# Patient Record
Sex: MALE | Race: WHITE | Employment: OTHER | URBAN - METROPOLITAN AREA
[De-identification: names, ages, dates, MRNs, and addresses within clinical notes are randomized per-mention and may not be internally consistent; named-entity substitution may affect disease eponyms.]

---

## 2017-02-07 RX ORDER — EZETIMIBE 10 MG/1
10 TABLET ORAL DAILY
Qty: 30 TAB | Refills: 11 | Status: SHIPPED | OUTPATIENT
Start: 2017-02-07 | End: 2018-01-08 | Stop reason: SDUPTHER

## 2017-02-07 RX ORDER — ATORVASTATIN CALCIUM 80 MG/1
80 TABLET, FILM COATED ORAL DAILY
Qty: 90 TAB | Refills: 3 | Status: SHIPPED | OUTPATIENT
Start: 2017-02-07 | End: 2018-01-08 | Stop reason: SDUPTHER

## 2017-09-06 RX ORDER — METOPROLOL TARTRATE 25 MG/1
25 TABLET, FILM COATED ORAL 2 TIMES DAILY
Qty: 180 TAB | Refills: 3 | Status: SHIPPED | OUTPATIENT
Start: 2017-09-06 | End: 2018-01-08 | Stop reason: SDUPTHER

## 2017-11-13 ENCOUNTER — OFFICE VISIT (OUTPATIENT)
Dept: CARDIOLOGY CLINIC | Age: 72
End: 2017-11-13

## 2017-11-13 VITALS
DIASTOLIC BLOOD PRESSURE: 53 MMHG | HEIGHT: 73 IN | BODY MASS INDEX: 24.39 KG/M2 | SYSTOLIC BLOOD PRESSURE: 114 MMHG | HEART RATE: 57 BPM | WEIGHT: 184 LBS

## 2017-11-13 DIAGNOSIS — E78.00 PURE HYPERCHOLESTEROLEMIA: ICD-10-CM

## 2017-11-13 DIAGNOSIS — Z98.61 POSTSURGICAL PERCUTANEOUS TRANSLUMINAL CORONARY ANGIOPLASTY STATUS: ICD-10-CM

## 2017-11-13 DIAGNOSIS — I25.10 ATHEROSCLEROSIS OF NATIVE CORONARY ARTERY OF NATIVE HEART WITHOUT ANGINA PECTORIS: Primary | ICD-10-CM

## 2017-11-13 NOTE — PROGRESS NOTES
1. Have you been to the ER, urgent care clinic since your last visit? Hospitalized since your last visit? No    2. Have you seen or consulted any other health care providers outside of the [address] [place] [address] since your last visit? Include any pap smears or colon screening. Yes Where: PCP     3. Since your last visit, have you had any of the following symptoms? .         4. Have you had any blood work, X-rays or cardiac testing? No             5.  Where do you normally have your labs drawn? [facility]    6. Do you need any refills today?    No

## 2017-11-13 NOTE — MR AVS SNAPSHOT
Visit Information Date & Time Provider Department Dept. Phone Encounter #  
 11/13/2017  8:15 AM Davis Landers MD [facility] [place] [phone] Follow-up Instructions Return in about 1 year (around 11/13/2018), or if symptoms worsen or fail to improve, for with ekg. Upcoming Health Maintenance Date Due Hepatitis C Screening 1945 DTaP/Tdap/Td series (1 - Tdap) 7/2/1967 FOBT Q 1 YEAR AGE 50-75 7/2/1996 ZOSTER VACCINE AGE 60> 5/2/2006 GLAUCOMA SCREENING Q2Y 7/2/2011 Pneumococcal 65+ High/Highest Risk (1 of 2 - PCV13) 7/2/2011 MEDICARE YEARLY EXAM 7/2/2011 Influenza Age 5 to Adult 8/1/2017 Allergies as of 11/13/2017  Review Complete On: 11/13/2017 By: Davis Landers MD  
 No Known Allergies Current Immunizations  Never Reviewed No immunizations on file. Not reviewed this visit You Were Diagnosed With   
  
 Codes Comments Atherosclerosis of native coronary artery of native heart without angina pectoris    -  Primary ICD-10-CM: I25.10 ICD-9-CM: 414.01 Stable LAD RAHEL last 3/2011 Pure hypercholesterolemia     ICD-10-CM: E78.00 ICD-9-CM: 272.0 9/15 LDL68; 3/15 MMD91;  
 Postsurgical percutaneous transluminal coronary angioplasty status     ICD-10-CM: Z98.61 ICD-9-CM: V45.82 Vitals BP Pulse Height(growth percentile) Weight(growth percentile) BMI Smoking Status 114/53 (!) 57 6' 1\" (1.854 m) 184 lb (83.5 kg) 24.28 kg/m2 Former Smoker Vitals History BMI and BSA Data Body Mass Index Body Surface Area  
 24.28 kg/m 2 2.07 m 2 Preferred Pharmacy Pharmacy Name Phone  
 [address] [place] [address] [phone] Your Updated Medication List  
  
   
This list is accurate as of: 11/13/17  8:51 AM.  Always use your most recent med list.  
  
  
  
  
 aspirin delayed-release 81 mg tablet Take  by mouth daily. atorvastatin 80 mg tablet Commonly known as:  LIPITOR Take 1 Tab by mouth daily. ezetimibe 10 mg tablet Commonly known as:  Olivia Rich Take 1 Tab by mouth daily. metoprolol tartrate 25 mg tablet Commonly known as:  LOPRESSOR Take 1 Tab by mouth two (2) times a day. nitroglycerin 0.4 mg SL tablet Commonly known as:  NITROSTAT  
1 Tab by SubLINGual route every five (5) minutes as needed for Chest Pain for up to 3 doses. TYLENOL EXTRA STRENGTH PO Take  by mouth as needed. Follow-up Instructions Return in about 1 year (around 11/13/2018), or if symptoms worsen or fail to improve, for with ekg. To-Do List   
 Around 11/13/2017 Lab:  HEPATIC FUNCTION PANEL Around 11/13/2017 Lab:  LIPID PANEL Patient Instructions There are no discontinued medications. Orders Placed This Encounter  LIPID PANEL Standing Status:   Future Standing Expiration Date:   2/13/2018  
 HEPATIC FUNCTION PANEL Standing Status:   Future Standing Expiration Date:   2/13/2018 Learning About Coronary Artery Disease (CAD) What is coronary artery disease? Coronary artery disease (CAD) occurs when plaque builds up in the arteries that bring oxygen-rich blood to your heart. Plaque is a fatty substance made of cholesterol, calcium, and other substances in the blood. This process is called hardening of the arteries, or atherosclerosis. What happens when you have coronary artery disease? · Plaque may narrow the coronary arteries. Narrowed arteries cause poor blood flow. This can lead to angina symptoms such as chest pain or discomfort. If blood flow is completely blocked, you could have a heart attack. · You can slow CAD and reduce the risk of future problems by making changes in your lifestyle. These include quitting smoking and eating heart-healthy foods.  
· Treatments for CAD, along with changes in your lifestyle, can help you live a longer and healthier life. How can you prevent coronary artery disease? · Do not smoke. It may be the best thing you can do to prevent heart disease. If you need help quitting, talk to your doctor about stop-smoking programs and medicines. These can increase your chances of quitting for good. · Be active. Get at least 30 minutes of exercise on most days of the week. Walking is a good choice. You also may want to do other activities, such as running, swimming, cycling, or playing tennis or team sports. · Eat heart-healthy foods. Eat more fruits and vegetables and less foods that contain saturated and trans fats. Limit alcohol, sodium, and sweets. · Stay at a healthy weight. Lose weight if you need to. · Manage other health problems such as diabetes, high blood pressure, and high cholesterol. · Manage stress. Stress can hurt your heart. To keep stress low, talk about your problems and feelings. Don't keep your feelings hidden. · If you have talked about it with your doctor, take a low-dose aspirin every day. Aspirin can help certain people lower their risk of a heart attack or stroke. But taking aspirin isn't right for everyone, because it can cause serious bleeding. Do not start taking daily aspirin unless your doctor knows about it. How is coronary artery disease treated? · Your doctor will suggest that you make lifestyle changes. For example, your doctor may ask you to eat healthy foods, quit smoking, lose extra weight, and be more active. · You will have to take medicines. · Your doctor may suggest a procedure to open narrowed or blocked arteries. This is called angioplasty. Or your doctor may suggest using healthy blood vessels to create detours around narrowed or blocked arteries. This is called bypass surgery. Follow-up care is a key part of your treatment and safety.  Be sure to make and go to all appointments, and call your doctor if you are having problems. It's also a good idea to know your test results and keep a list of the medicines you take. Where can you learn more? Go to http://kellen-leslie.info/. Enter [phone] in the search box to learn more about \"Learning About Coronary Artery Disease (CAD). \" Current as of: September 21, 2016 Content Version: 11.4 © 6226-5515 Mtivity. Care instructions adapted under license by Flapshare (which disclaims liability or warranty for this information). If you have questions about a medical condition or this instruction, always ask your healthcare professional. [place] [address] any warranty or liability for your use of this information. Introducing [facility]! Holli Santo introduces Riskonnect patient portal. Now you can access parts of your medical record, email your doctor's office, and request medication refills online. 1. In your internet browser, go to [email or web address] 2. Click on the First Time User? Click Here link in the Sign In box. You will see the New Member Sign Up page. 3. Enter your Riskonnect Access Code exactly as it appears below. You will not need to use this code after youve completed the sign-up process. If you do not sign up before the expiration date, you must request a new code. · Riskonnect Access Code: [email or web address] Expires: 2/11/2018  8:16 AM 
 
4. Enter the last four digits of your Social Security Number (xxxx) and Date of Birth (mm/dd/yyyy) as indicated and click Submit. You will be taken to the next sign-up page. 5. Create a Riskonnect ID. This will be your Riskonnect login ID and cannot be changed, so think of one that is secure and easy to remember. 6. Create a Riskonnect password. You can change your password at any time. 7. Enter your Password Reset Question and Answer. This can be used at a later time if you forget your password. 8. Enter your e-mail address. You will receive e-mail notification when new information is available in [address] 9. Click Sign Up. You can now view and download portions of your medical record. 10. Click the Download Summary menu link to download a portable copy of your medical information. If you have questions, please visit the Frequently Asked Questions section of the Pre Play Sports website. Remember, Pre Play Sports is NOT to be used for urgent needs. For medical emergencies, dial 911. Now available from your iPhone and Android! Please provide this summary of care documentation to your next provider. Your primary care clinician is listed as [facility] Saurav. If you have any questions after today's visit, please call [phone].

## 2017-11-13 NOTE — LETTER
Priscilla Lea 1945 11/13/2017 Dear LARA Lopez MD Jim Sheehan, MD 
 
I had the pleasure of evaluating  Mr. Edge in office today. Below are the relevant portions of my assessment and plan of care. ICD-10-CM ICD-9-CM 1. Atherosclerosis of native coronary artery of native heart without angina pectoris I25.10 414.01 Stable LAD RAHEL last 3/2011 2. Pure hypercholesterolemia E78.00 272.0 LIPID PANEL  
   HEPATIC FUNCTION PANEL  
 9/15 LDL68; 3/15 LDL89;  
3. Postsurgical percutaneous transluminal coronary angioplasty status Z98.61 V45.82 Current Outpatient Prescriptions Medication Sig Dispense Refill  metoprolol tartrate (LOPRESSOR) 25 mg tablet Take 1 Tab by mouth two (2) times a day. 180 Tab 3  
 ezetimibe (ZETIA) 10 mg tablet Take 1 Tab by mouth daily. 30 Tab 11  
 atorvastatin (LIPITOR) 80 mg tablet Take 1 Tab by mouth daily. 90 Tab 3  
 aspirin delayed-release 81 mg tablet Take  by mouth daily.  nitroglycerin (NITROSTAT) 0.4 mg SL tablet 1 Tab by SubLINGual route every five (5) minutes as needed for Chest Pain for up to 3 doses. 25 Tab 0  ACETAMINOPHEN (TYLENOL EXTRA STRENGTH PO) Take  by mouth as needed. Orders Placed This Encounter  LIPID PANEL Standing Status:   Future Standing Expiration Date:   2/13/2018  
 HEPATIC FUNCTION PANEL Standing Status:   Future Standing Expiration Date:   2/13/2018 If you have questions, please do not hesitate to call me. I look forward to following Mr. Edge along with you. Sincerely, Demi Sanchez MD
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
## 2017-11-14 LAB
A-G RATIO,AGRAT: 1.5 RATIO (ref 1.1–2.6)
ALBUMIN SERPL-MCNC: 4.4 G/DL (ref 3.5–5)
ALP SERPL-CCNC: 87 U/L (ref 40–125)
ALT SERPL-CCNC: 20 U/L (ref 5–40)
AST SERPL W P-5'-P-CCNC: 24 U/L (ref 10–37)
BILIRUB SERPL-MCNC: 0.6 MG/DL (ref 0.2–1.2)
BILIRUBIN, DIRECT,CBIL: <0.2 MG/DL (ref 0–0.3)
CHOLEST SERPL-MCNC: 134 MG/DL (ref 110–200)
GLOBULIN,GLOB: 3 G/DL (ref 2–4)
HDLC SERPL-MCNC: 39 MG/DL (ref 40–59)
LDLC SERPL CALC-MCNC: 73 MG/DL (ref 50–99)
PROT SERPL-MCNC: 7.4 G/DL (ref 6.2–8.1)
TRIGL SERPL-MCNC: 109 MG/DL (ref 40–149)
VLDLC SERPL CALC-MCNC: 22 MG/DL (ref 8–30)

## 2018-01-08 RX ORDER — METOPROLOL TARTRATE 25 MG/1
25 TABLET, FILM COATED ORAL 2 TIMES DAILY
Qty: 180 TAB | Refills: 3 | Status: SHIPPED | OUTPATIENT
Start: 2018-01-08 | End: 2018-11-02 | Stop reason: SDUPTHER

## 2018-01-08 RX ORDER — ATORVASTATIN CALCIUM 80 MG/1
80 TABLET, FILM COATED ORAL DAILY
Qty: 90 TAB | Refills: 3 | Status: SHIPPED | OUTPATIENT
Start: 2018-01-08 | End: 2018-11-02 | Stop reason: SDUPTHER

## 2018-01-08 RX ORDER — EZETIMIBE 10 MG/1
10 TABLET ORAL DAILY
Qty: 30 TAB | Refills: 11 | Status: SHIPPED | OUTPATIENT
Start: 2018-01-08 | End: 2018-09-23 | Stop reason: SDUPTHER

## 2018-09-24 RX ORDER — EZETIMIBE 10 MG/1
TABLET ORAL
Qty: 90 TAB | Refills: 8 | Status: SHIPPED | OUTPATIENT
Start: 2018-09-24 | End: 2019-10-21 | Stop reason: SDUPTHER

## 2018-11-02 ENCOUNTER — OFFICE VISIT (OUTPATIENT)
Dept: CARDIOLOGY CLINIC | Age: 73
End: 2018-11-02

## 2018-11-02 VITALS
BODY MASS INDEX: 23.86 KG/M2 | HEIGHT: 73 IN | WEIGHT: 180 LBS | HEART RATE: 56 BPM | SYSTOLIC BLOOD PRESSURE: 118 MMHG | DIASTOLIC BLOOD PRESSURE: 79 MMHG

## 2018-11-02 DIAGNOSIS — E78.00 PURE HYPERCHOLESTEROLEMIA: ICD-10-CM

## 2018-11-02 DIAGNOSIS — I50.32 CHRONIC DIASTOLIC HEART FAILURE (HCC): ICD-10-CM

## 2018-11-02 DIAGNOSIS — I25.10 ATHEROSCLEROSIS OF NATIVE CORONARY ARTERY OF NATIVE HEART WITHOUT ANGINA PECTORIS: Primary | ICD-10-CM

## 2018-11-02 DIAGNOSIS — Z98.61 POSTSURGICAL PERCUTANEOUS TRANSLUMINAL CORONARY ANGIOPLASTY STATUS: ICD-10-CM

## 2018-11-02 RX ORDER — ATORVASTATIN CALCIUM 80 MG/1
80 TABLET, FILM COATED ORAL DAILY
Qty: 90 TAB | Refills: 3 | Status: SHIPPED | OUTPATIENT
Start: 2018-11-02 | End: 2019-01-03 | Stop reason: SDUPTHER

## 2018-11-02 RX ORDER — METOPROLOL TARTRATE 25 MG/1
25 TABLET, FILM COATED ORAL 2 TIMES DAILY
Qty: 180 TAB | Refills: 3 | Status: SHIPPED | OUTPATIENT
Start: 2018-11-02 | End: 2019-01-03 | Stop reason: SDUPTHER

## 2018-11-02 RX ORDER — METOPROLOL TARTRATE 25 MG/1
25 TABLET, FILM COATED ORAL 2 TIMES DAILY
Qty: 180 TAB | Refills: 3 | Status: SHIPPED | OUTPATIENT
Start: 2018-11-02 | End: 2018-11-02 | Stop reason: SDUPTHER

## 2018-11-02 NOTE — PATIENT INSTRUCTIONS
Medications Discontinued During This Encounter Medication Reason  metoprolol tartrate (LOPRESSOR) 25 mg tablet Reorder  atorvastatin (LIPITOR) 80 mg tablet Reorder  metoprolol tartrate (LOPRESSOR) 25 mg tablet Reorder Learning About Coronary Artery Disease (CAD) What is coronary artery disease? Coronary artery disease (CAD) occurs when plaque builds up in the arteries that bring oxygen-rich blood to your heart. Plaque is a fatty substance made of cholesterol, calcium, and other substances in the blood. This process is called hardening of the arteries, or atherosclerosis. What happens when you have coronary artery disease? · Plaque may narrow the coronary arteries. Narrowed arteries cause poor blood flow. This can lead to angina symptoms such as chest pain or discomfort. If blood flow is completely blocked, you could have a heart attack. · You can slow CAD and reduce the risk of future problems by making changes in your lifestyle. These include quitting smoking and eating heart-healthy foods. · Treatments for CAD, along with changes in your lifestyle, can help you live a longer and healthier life. How can you prevent coronary artery disease? · Do not smoke. It may be the best thing you can do to prevent heart disease. If you need help quitting, talk to your doctor about stop-smoking programs and medicines. These can increase your chances of quitting for good. · Be active. Get at least 30 minutes of exercise on most days of the week. Walking is a good choice. You also may want to do other activities, such as running, swimming, cycling, or playing tennis or team sports. · Eat heart-healthy foods. Eat more fruits and vegetables and less foods that contain saturated and trans fats. Limit alcohol, sodium, and sweets. · Stay at a healthy weight. Lose weight if you need to. · Manage other health problems such as diabetes, high blood pressure, and high cholesterol. · Manage stress. Stress can hurt your heart. To keep stress low, talk about your problems and feelings. Don't keep your feelings hidden. · If you have talked about it with your doctor, take a low-dose aspirin every day. Aspirin can help certain people lower their risk of a heart attack or stroke. But taking aspirin isn't right for everyone, because it can cause serious bleeding. Do not start taking daily aspirin unless your doctor knows about it. How is coronary artery disease treated? · Your doctor will suggest that you make lifestyle changes. For example, your doctor may ask you to eat healthy foods, quit smoking, lose extra weight, and be more active. · You will have to take medicines. · Your doctor may suggest a procedure to open narrowed or blocked arteries. This is called angioplasty. Or your doctor may suggest using healthy blood vessels to create detours around narrowed or blocked arteries. This is called bypass surgery. Follow-up care is a key part of your treatment and safety. Be sure to make and go to all appointments, and call your doctor if you are having problems. It's also a good idea to know your test results and keep a list of the medicines you take. Where can you learn more? Go to http://kellen-leslie.info/. Enter ([phone] [phone] in the search box to learn more about \"Learning About Coronary Artery Disease (CAD). \" Current as of: December 6, 2017 Content Version: 11.8 © 1326-4204 Healthwise, Incorporated. Care instructions adapted under license by Tap 'n Tap (which disclaims liability or warranty for this information). If you have questions about a medical condition or this instruction, always ask your healthcare professional. [address] any warranty or liability for your use of this information. Lutonix Activation Thank you for requesting access to Lutonix.  Please follow the instructions below to securely access and download your online medical record. Conjecta allows you to send messages to your doctor, view your test results, renew your prescriptions, schedule appointments, and more. How Do I Sign Up? 1. In your internet browser, go to [email or web address]. 2. Click on the First Time User? Click Here link in the Sign In box. You will see the New Member Sign Up page. 3. Enter your Conjecta Access Code exactly as it appears below. You will not need to use this code after youve completed the sign-up process. If you do not sign up before the expiration date, you must request a new code. Conjecta Access Code: [email or web address] Expires: 2019  8:18 AM (This is the date your Conjecta access code will ) 4. Enter the last four digits of your Social Security Number (xxxx) and Date of Birth (mm/dd/yyyy) as indicated and click Submit. You will be taken to the next sign-up page. 5. Create a Conjecta ID. This will be your Conjecta login ID and cannot be changed, so think of one that is secure and easy to remember. 6. Create a Conjecta password. You can change your password at any time. 7. Enter your Password Reset Question and Answer. This can be used at a later time if you forget your password. 8. Enter your e-mail address. You will receive e-mail notification when new information is available in [address] 9. Click Sign Up. You can now view and download portions of your medical record. 10. Click the Download Summary menu link to download a portable copy of your medical information. Additional Information If you have questions, please visit the Frequently Asked Questions section of the Conjecta website at [email or web address] Remember, Conjecta is NOT to be used for urgent needs. For medical emergencies, dial 911.

## 2018-11-02 NOTE — PROGRESS NOTES
1. Have you been to the ER, urgent care clinic since your last visit? Hospitalized since your last visit? 
 
 no 
2. Have you seen or consulted any other health care providers outside of the [address] [place] [address] since your last visit? Include any pap smears or colon screening. Yes Where: no  
 
3. Since your last visit, have you had any of the following symptoms? 4. Have you had any blood work, X-rays or cardiac testing? No 
 
 } 5. Where do you normally have your labs drawn? [facility] 6. Do you need any refills? yes
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
## 2018-11-02 NOTE — LETTER
Awa Stevenson 1945 11/2/2018 Dear Evaristo Christian, MD Leopoldo Patter, MD 
 
I had the pleasure of evaluating  Mr. Edge in office today. Below are the relevant portions of my assessment and plan of care. ICD-10-CM ICD-9-CM 1. Atherosclerosis of native coronary artery of native heart without angina pectoris I25.10 414.01 metoprolol tartrate (LOPRESSOR) 25 mg tablet 2. Chronic diastolic heart failure (HCC) I50.32 428.32 AMB POC EKG ROUTINE W/ 12 LEADS, INTER & REP  
   METABOLIC PANEL, BASIC 3. Pure hypercholesterolemia E78.00 272.0 HEPATIC FUNCTION PANEL  
   LIPID PANEL  
   atorvastatin (LIPITOR) 80 mg tablet 4. Postsurgical percutaneous transluminal coronary angioplasty status Z98.61 V45.82 Current Outpatient Medications Medication Sig Dispense Refill  atorvastatin (LIPITOR) 80 mg tablet Take 1 Tab by mouth daily. 90 Tab 3  
 metoprolol tartrate (LOPRESSOR) 25 mg tablet Take 1 Tab by mouth two (2) times a day. 180 Tab 3  
 ezetimibe (ZETIA) 10 mg tablet TAKE 1 TABLET BY MOUTH DAILY 90 Tab 8  aspirin delayed-release 81 mg tablet Take  by mouth daily.  nitroglycerin (NITROSTAT) 0.4 mg SL tablet 1 Tab by SubLINGual route every five (5) minutes as needed for Chest Pain for up to 3 doses. 25 Tab 0  ACETAMINOPHEN (TYLENOL EXTRA STRENGTH PO) Take  by mouth as needed. Orders Placed This Encounter  METABOLIC PANEL, BASIC Standing Status:   Future Standing Expiration Date:   2/2/2019  
 HEPATIC FUNCTION PANEL Standing Status:   Future Standing Expiration Date:   2/2/2019  LIPID PANEL Standing Status:   Future Standing Expiration Date:   2/2/2019  AMB POC EKG ROUTINE W/ 12 LEADS, INTER & REP Order Specific Question:   Reason for Exam: Answer:   cad  DISCONTD: metoprolol tartrate (LOPRESSOR) 25 mg tablet Sig: Take 1 Tab by mouth two (2) times a day. Dispense:  180 Tab Refill:  3  atorvastatin (LIPITOR) 80 mg tablet Sig: Take 1 Tab by mouth daily. Dispense:  90 Tab Refill:  3  
 metoprolol tartrate (LOPRESSOR) 25 mg tablet Sig: Take 1 Tab by mouth two (2) times a day. Dispense:  180 Tab Refill:  3 If you have questions, please do not hesitate to call me. I look forward to following Mr. Edge along with you. Sincerely, Eric Strickland MD

## 2018-11-05 LAB
A-G RATIO,AGRAT: 1.6 RATIO (ref 1.1–2.6)
ALBUMIN SERPL-MCNC: 4.2 G/DL (ref 3.5–5)
ALP SERPL-CCNC: 93 U/L (ref 40–125)
ALT SERPL-CCNC: 28 U/L (ref 5–40)
ANION GAP SERPL CALC-SCNC: 10.3 MMOL/L
AST SERPL W P-5'-P-CCNC: 28 U/L (ref 10–37)
BILIRUB SERPL-MCNC: 0.5 MG/DL (ref 0.2–1.2)
BILIRUBIN, DIRECT,CBIL: <0.2 MG/DL (ref 0–0.3)
BUN SERPL-MCNC: 10 MG/DL (ref 6–22)
CALCIUM SERPL-MCNC: 9 MG/DL (ref 8.4–10.4)
CHLORIDE SERPL-SCNC: 107 MMOL/L (ref 98–110)
CHOLEST SERPL-MCNC: 114 MG/DL (ref 110–200)
CO2 SERPL-SCNC: 26 MMOL/L (ref 20–32)
CREAT SERPL-MCNC: 0.7 MG/DL (ref 0.8–1.6)
GFRAA, 66117: >60
GFRNA, 66118: >60
GLOBULIN,GLOB: 2.7 G/DL (ref 2–4)
GLUCOSE SERPL-MCNC: 95 MG/DL (ref 70–99)
HDLC SERPL-MCNC: 3 MG/DL (ref 0–5)
HDLC SERPL-MCNC: 38 MG/DL (ref 40–59)
LDLC SERPL CALC-MCNC: 63 MG/DL (ref 50–99)
POTASSIUM SERPL-SCNC: 4.6 MMOL/L (ref 3.5–5.5)
PROT SERPL-MCNC: 6.9 G/DL (ref 6.2–8.1)
SODIUM SERPL-SCNC: 143 MMOL/L (ref 133–145)
TRIGL SERPL-MCNC: 67 MG/DL (ref 40–149)
VLDLC SERPL CALC-MCNC: 13 MG/DL (ref 8–30)

## 2019-01-03 DIAGNOSIS — I25.10 ATHEROSCLEROSIS OF NATIVE CORONARY ARTERY OF NATIVE HEART WITHOUT ANGINA PECTORIS: ICD-10-CM

## 2019-01-03 DIAGNOSIS — E78.00 PURE HYPERCHOLESTEROLEMIA: ICD-10-CM

## 2019-01-03 RX ORDER — METOPROLOL TARTRATE 25 MG/1
25 TABLET, FILM COATED ORAL 2 TIMES DAILY
Qty: 180 TAB | Refills: 3 | Status: SHIPPED | OUTPATIENT
Start: 2019-01-03 | End: 2019-10-21 | Stop reason: SDUPTHER

## 2019-01-03 RX ORDER — ATORVASTATIN CALCIUM 80 MG/1
80 TABLET, FILM COATED ORAL DAILY
Qty: 90 TAB | Refills: 3 | Status: SHIPPED | OUTPATIENT
Start: 2019-01-03 | End: 2019-10-21 | Stop reason: SDUPTHER

## 2019-01-29 RX ORDER — NITROGLYCERIN 0.4 MG/1
0.4 TABLET SUBLINGUAL
Qty: 25 TAB | Refills: 0 | Status: SHIPPED | OUTPATIENT
Start: 2019-01-29 | End: 2022-09-30 | Stop reason: ALTCHOICE

## 2019-10-21 DIAGNOSIS — E78.00 PURE HYPERCHOLESTEROLEMIA: ICD-10-CM

## 2019-10-21 DIAGNOSIS — I25.10 ATHEROSCLEROSIS OF NATIVE CORONARY ARTERY OF NATIVE HEART WITHOUT ANGINA PECTORIS: ICD-10-CM

## 2019-10-21 RX ORDER — ATORVASTATIN CALCIUM 80 MG/1
80 TABLET, FILM COATED ORAL DAILY
Qty: 90 TAB | Refills: 3 | Status: SHIPPED | OUTPATIENT
Start: 2019-10-21 | End: 2020-01-20 | Stop reason: SDUPTHER

## 2019-10-21 RX ORDER — METOPROLOL TARTRATE 25 MG/1
25 TABLET, FILM COATED ORAL 2 TIMES DAILY
Qty: 180 TAB | Refills: 3 | Status: SHIPPED | OUTPATIENT
Start: 2019-10-21 | End: 2020-01-20 | Stop reason: SDUPTHER

## 2019-10-21 RX ORDER — EZETIMIBE 10 MG/1
TABLET ORAL
Qty: 90 TAB | Refills: 0 | Status: SHIPPED | OUTPATIENT
Start: 2019-10-21 | End: 2020-01-20

## 2019-11-11 ENCOUNTER — OFFICE VISIT (OUTPATIENT)
Dept: CARDIOLOGY CLINIC | Age: 74
End: 2019-11-11

## 2019-11-11 VITALS
HEART RATE: 67 BPM | HEIGHT: 73 IN | WEIGHT: 182 LBS | BODY MASS INDEX: 24.12 KG/M2 | DIASTOLIC BLOOD PRESSURE: 76 MMHG | SYSTOLIC BLOOD PRESSURE: 130 MMHG

## 2019-11-11 DIAGNOSIS — I25.10 ATHEROSCLEROSIS OF NATIVE CORONARY ARTERY OF NATIVE HEART WITHOUT ANGINA PECTORIS: Primary | ICD-10-CM

## 2019-11-11 DIAGNOSIS — E78.00 PURE HYPERCHOLESTEROLEMIA: ICD-10-CM

## 2019-11-11 DIAGNOSIS — Z98.61 POSTSURGICAL PERCUTANEOUS TRANSLUMINAL CORONARY ANGIOPLASTY STATUS: ICD-10-CM

## 2019-11-11 DIAGNOSIS — I50.32 CHRONIC DIASTOLIC HEART FAILURE (HCC): ICD-10-CM
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
## 2019-11-11 NOTE — PATIENT INSTRUCTIONS
There are no discontinued medications. A Healthy Heart: Care Instructions Your Care Instructions Heart disease occurs when a substance called plaque builds up in the vessels that supply oxygen-rich blood to your heart. This can narrow the blood vessels and reduce blood flow. A heart attack happens when blood flow is completely blocked. A high-fat diet, smoking, and other factors increase the risk of heart disease. Your doctor has found that you have a chance of having heart disease. You can do lots of things to keep your heart healthy. It may not be easy, but you can change your diet, exercise more, and quit smoking. These steps really work to lower your chance of heart disease. Follow-up care is a key part of your treatment and safety. Be sure to make and go to all appointments, and call your doctor if you are having problems. It's also a good idea to know your test results and keep a list of the medicines you take. How can you care for yourself at home? Diet 
  · Use less salt when you cook and eat. This helps lower your blood pressure. Taste food before salting. Add only a little salt when you think you need it. With time, your taste buds will adjust to less salt.  
  · Eat fewer snack items, fast foods, canned soups, and other high-salt, high-fat, processed foods.  
  · Read food labels and try to avoid saturated and trans fats. They increase your risk of heart disease by raising cholesterol levels.  
  · Limit the amount of solid fat-butter, margarine, and shortening-you eat. Use olive, peanut, or canola oil when you cook. Bake, broil, and steam foods instead of frying them.  
  · Eating fish can lower your risk for heart disease. Eat at least 2 servings of fish a week. [place], mackerel, herring, sardines, and chunk light tuna are very good choices. These fish contain omega-3 fatty acids.  
  · Eat a variety of fruit and vegetables every day.  Dark green, deep orange, red, or yellow fruits and vegetables are especially good for you. Examples include spinach, carrots, peaches, and berries.  
  · Foods high in fiber can reduce your cholesterol and provide important vitamins and minerals. High-fiber foods include whole-grain cereals and breads, oatmeal, beans, brown rice, citrus fruits, and apples.  
  · Limit drinks and foods with added sugar. These include candy, desserts, and soda pop.  
 Lifestyle changes 
  · If your doctor recommends it, get more exercise. Walking is a good choice. Bit by bit, increase the amount you walk every day. Try for at least 30 minutes on most days of the week. You also may want to swim, bike, or do other activities.  
  · Do not smoke. If you need help quitting, talk to your doctor about stop-smoking programs and medicines. These can increase your chances of quitting for good. Quitting smoking may be the most important step you can take to protect your heart. It is never too late to quit. You will get health benefits right away.  
  · Limit alcohol to 2 drinks a day for men and 1 drink a day for women. Too much alcohol can cause health problems. Medicines 
  · Take your medicines exactly as prescribed. Call your doctor if you think you are having a problem with your medicine.  
  · If your doctor recommends aspirin, take the amount directed each day. Make sure you take aspirin and not another kind of pain reliever, such as acetaminophen (Tylenol). If you take ibuprofen (such as Advil or Motrin) for other problems, take aspirin at least 2 hours before taking ibuprofen. When should you call for help? Call 911 if you have symptoms of a heart attack. These may include: 
  · Chest pain or pressure, or a strange feeling in the chest.  
  · Sweating.  
  · Shortness of breath.  
  · Pain, pressure, or a strange feeling in the back, neck, jaw, or upper belly or in one or both shoulders or arms.  
  · Lightheadedness or sudden weakness.   · A fast or irregular heartbeat.  
 After you call 911, the  may tell you to chew 1 adult-strength or 2 to 4 low-dose aspirin. Wait for an ambulance. Do not try to drive yourself. 
 Watch closely for changes in your health, and be sure to contact your doctor if you have any problems. Where can you learn more? Go to http://kellen-leslie.info/. Enter N655 in the search box to learn more about \"A Healthy Heart: Care Instructions. \" Current as of: April 9, 2019 Content Version: 12.2 © 3514-7807 [employer]. Care instructions adapted under license by [employer] (which disclaims liability or warranty for this information). If you have questions about a medical condition or this instruction, always ask your healthcare professional. [address] any warranty or liability for your use of this information.

## 2019-11-11 NOTE — PROGRESS NOTES
1. Have you been to the ER, urgent care clinic since your last visit? Hospitalized since your last visit?     no    2. Have you seen or consulted any other health care providers outside of the [address] [place] [address] since your last visit? Include any pap smears or colon screening. Yes Where: pcp     3. Since your last visit, have you had any of the following symptoms? shortness of breath. 4.  Have you had any blood work, X-rays or cardiac testing? No         5. Where do you normally have your labs drawn? [facility]  6. Do you need any refills today?    no

## 2019-11-21 ENCOUNTER — TELEPHONE (OUTPATIENT)
Dept: CARDIOLOGY CLINIC | Age: 74
End: 2019-11-21

## 2019-11-21 NOTE — TELEPHONE ENCOUNTER
Per paper message (labs) No significant abnormalities. Spoke with patients wife and she is aware that there is no significant abnormalities.

## 2019-12-03 DIAGNOSIS — I25.10 ATHEROSCLEROSIS OF NATIVE CORONARY ARTERY OF NATIVE HEART WITHOUT ANGINA PECTORIS: ICD-10-CM

## 2020-01-20 DIAGNOSIS — E78.00 PURE HYPERCHOLESTEROLEMIA: ICD-10-CM

## 2020-01-20 DIAGNOSIS — I25.10 ATHEROSCLEROSIS OF NATIVE CORONARY ARTERY OF NATIVE HEART WITHOUT ANGINA PECTORIS: ICD-10-CM

## 2020-01-20 RX ORDER — METOPROLOL TARTRATE 25 MG/1
25 TABLET, FILM COATED ORAL 2 TIMES DAILY
Qty: 180 TAB | Refills: 3 | Status: SHIPPED | OUTPATIENT
Start: 2020-01-20 | End: 2021-02-04 | Stop reason: SDUPTHER

## 2020-01-20 RX ORDER — EZETIMIBE 10 MG/1
TABLET ORAL
Qty: 90 TAB | Refills: 3 | Status: SHIPPED | OUTPATIENT
Start: 2020-01-20 | End: 2021-02-04 | Stop reason: SDUPTHER

## 2020-01-20 RX ORDER — ATORVASTATIN CALCIUM 80 MG/1
80 TABLET, FILM COATED ORAL DAILY
Qty: 90 TAB | Refills: 3 | Status: SHIPPED | OUTPATIENT
Start: 2020-01-20 | End: 2021-02-04 | Stop reason: SDUPTHER

## 2020-01-20 NOTE — TELEPHONE ENCOUNTER
Requested Prescriptions     Pending Prescriptions Disp Refills    metoprolol tartrate (LOPRESSOR) 25 mg tablet 180 Tab 3     Sig: Take 1 Tab by mouth two (2) times a day.  ezetimibe (ZETIA) 10 mg tablet 90 Tab 3     Sig: TAKE 1 TABLET BY MOUTH DAILY    atorvastatin (LIPITOR) 80 mg tablet 90 Tab 3     Sig: Take 1 Tab by mouth daily.

## 2020-06-18 NOTE — TELEPHONE ENCOUNTER
Follow up is scheduled for November 13, 2017
No - the patient is unable to be screened due to medical condition

## 2021-02-04 DIAGNOSIS — I25.10 ATHEROSCLEROSIS OF NATIVE CORONARY ARTERY OF NATIVE HEART WITHOUT ANGINA PECTORIS: ICD-10-CM

## 2021-02-04 DIAGNOSIS — E78.00 PURE HYPERCHOLESTEROLEMIA: ICD-10-CM

## 2021-02-04 RX ORDER — METOPROLOL TARTRATE 25 MG/1
25 TABLET, FILM COATED ORAL 2 TIMES DAILY
Qty: 180 TAB | Refills: 0 | Status: SHIPPED | OUTPATIENT
Start: 2021-02-04 | End: 2021-07-15 | Stop reason: SDUPTHER

## 2021-02-04 RX ORDER — ATORVASTATIN CALCIUM 80 MG/1
80 TABLET, FILM COATED ORAL DAILY
Qty: 90 TAB | Refills: 0 | Status: SHIPPED | OUTPATIENT
Start: 2021-02-04 | End: 2021-05-10 | Stop reason: SDUPTHER

## 2021-02-04 RX ORDER — EZETIMIBE 10 MG/1
TABLET ORAL
Qty: 90 TAB | Refills: 0 | Status: SHIPPED | OUTPATIENT
Start: 2021-02-04 | End: 2021-05-10 | Stop reason: SDUPTHER

## 2021-02-26 DIAGNOSIS — E78.00 PURE HYPERCHOLESTEROLEMIA: ICD-10-CM

## 2021-02-26 NOTE — PROGRESS NOTES
Reviewed and acceptable. Please check that why did not help is also available in care everywhere then we do not need to scan.

## 2021-05-06 ENCOUNTER — OFFICE VISIT (OUTPATIENT)
Dept: PULMONOLOGY | Age: 76
End: 2021-05-06
Payer: MEDICARE

## 2021-05-06 VITALS
SYSTOLIC BLOOD PRESSURE: 122 MMHG | DIASTOLIC BLOOD PRESSURE: 70 MMHG | RESPIRATION RATE: 18 BRPM | OXYGEN SATURATION: 98 % | BODY MASS INDEX: 21.06 KG/M2 | HEART RATE: 68 BPM | HEIGHT: 71 IN | WEIGHT: 150.4 LBS | TEMPERATURE: 98 F

## 2021-05-06 DIAGNOSIS — J84.10 PULMONARY FIBROSIS (HCC): ICD-10-CM

## 2021-05-06 DIAGNOSIS — U09.9 POST-COVID SYNDROME: ICD-10-CM

## 2021-05-06 DIAGNOSIS — J96.21 ACUTE ON CHRONIC RESPIRATORY FAILURE WITH HYPOXIA (HCC): Primary | ICD-10-CM

## 2021-05-06 DIAGNOSIS — I25.10 ATHEROSCLEROSIS OF NATIVE CORONARY ARTERY OF NATIVE HEART WITHOUT ANGINA PECTORIS: ICD-10-CM

## 2021-05-06 DIAGNOSIS — Z98.61 POSTSURGICAL PERCUTANEOUS TRANSLUMINAL CORONARY ANGIOPLASTY STATUS: ICD-10-CM

## 2021-05-06 DIAGNOSIS — E78.00 PURE HYPERCHOLESTEROLEMIA: ICD-10-CM

## 2021-05-06 PROCEDURE — G8536 NO DOC ELDER MAL SCRN: HCPCS | Performed by: INTERNAL MEDICINE

## 2021-05-06 PROCEDURE — 1101F PT FALLS ASSESS-DOCD LE1/YR: CPT | Performed by: INTERNAL MEDICINE

## 2021-05-06 PROCEDURE — 3017F COLORECTAL CA SCREEN DOC REV: CPT | Performed by: INTERNAL MEDICINE

## 2021-05-06 PROCEDURE — 99205 OFFICE O/P NEW HI 60 MIN: CPT | Performed by: INTERNAL MEDICINE

## 2021-05-06 PROCEDURE — G8432 DEP SCR NOT DOC, RNG: HCPCS | Performed by: INTERNAL MEDICINE

## 2021-05-06 PROCEDURE — G8420 CALC BMI NORM PARAMETERS: HCPCS | Performed by: INTERNAL MEDICINE

## 2021-05-06 PROCEDURE — G8427 DOCREV CUR MEDS BY ELIG CLIN: HCPCS | Performed by: INTERNAL MEDICINE

## 2021-05-06 RX ORDER — EZETIMIBE 10 MG/1
TABLET ORAL
Qty: 90 TAB | Refills: 3 | OUTPATIENT
Start: 2021-05-06

## 2021-05-06 RX ORDER — ALBUTEROL SULFATE 90 UG/1
AEROSOL, METERED RESPIRATORY (INHALATION)
COMMUNITY
Start: 2021-04-28

## 2021-05-06 RX ORDER — ATORVASTATIN CALCIUM 80 MG/1
80 TABLET, FILM COATED ORAL DAILY
Qty: 90 TAB | Refills: 3 | OUTPATIENT
Start: 2021-05-06

## 2021-05-06 RX ORDER — OMEPRAZOLE 40 MG/1
CAPSULE, DELAYED RELEASE ORAL
COMMUNITY
Start: 2021-04-29 | End: 2021-08-19

## 2021-05-06 NOTE — LETTER
5/6/2021 Patient: Ayla Kiran YOB: 1945 Date of Visit: 5/6/2021 Lore Berumen NP 
[address] [facility] [address] Via Fax: [phone] MD Chase Talley [address] [facility] [address] Via Fax: [phone] Dear LARA Scanlon MD, Thank you for referring Mr. Mireille Van to [address] [place] [address] for evaluation. My notes for this consultation are attached. If you have questions, please do not hesitate to call me. I look forward to following your patient along with you.  
 
 
Sincerely, 
 
Sylvain Herman MD

## 2021-05-06 NOTE — PROGRESS NOTES
Kenroy Thomas presents today for   Chief Complaint   Patient presents with    Shortness of Breath       Is someone accompanying this pt? Wife    Is the patient using any DME equipment during OV? Oxygen     -[employer]    Depression Screening:  3 most recent PHQ Screens 11/2/2018   Little interest or pleasure in doing things Not at all   Feeling down, depressed, irritable, or hopeless Not at all   Total Score PHQ 2 0       Learning Assessment:  Learning Assessment 3/23/2015   PRIMARY LEARNER Patient   PRIMARY LANGUAGE ENGLISH   LEARNER PREFERENCE PRIMARY LISTENING   ANSWERED BY patient   RELATIONSHIP SELF       Abuse Screening:  No flowsheet data found. Fall Risk  Fall Risk Assessment, last 12 mths 11/11/2019   Able to walk? Yes   Fall in past 12 months? No         Coordination of Care:  1. Have you been to the ER, urgent care clinic since your last visit? Hospitalized since your last visit? 12/2020  [facility]    2. Have you seen or consulted any other health care providers outside of the [address] [place] [address] since your last visit? Include any pap smears or colon screening. Chioma White NP PCP     Patient has received both Moderna Vaccines.  Current Flu and Pneumo Vaccines

## 2021-05-07 ENCOUNTER — DOCUMENTATION ONLY (OUTPATIENT)
Dept: PULMONOLOGY | Age: 76
End: 2021-05-07

## 2021-05-10 ENCOUNTER — TELEPHONE (OUTPATIENT)
Dept: PULMONOLOGY | Age: 76
End: 2021-05-10

## 2021-05-10 DIAGNOSIS — I25.10 ATHEROSCLEROSIS OF NATIVE CORONARY ARTERY OF NATIVE HEART WITHOUT ANGINA PECTORIS: ICD-10-CM

## 2021-05-10 DIAGNOSIS — E78.00 PURE HYPERCHOLESTEROLEMIA: ICD-10-CM

## 2021-05-10 RX ORDER — EZETIMIBE 10 MG/1
TABLET ORAL
Qty: 90 TAB | Refills: 0 | Status: SHIPPED | OUTPATIENT
Start: 2021-05-10 | End: 2021-10-18

## 2021-05-10 RX ORDER — ATORVASTATIN CALCIUM 80 MG/1
80 TABLET, FILM COATED ORAL DAILY
Qty: 90 TAB | Refills: 0 | Status: SHIPPED | OUTPATIENT
Start: 2021-05-10 | End: 2021-09-10 | Stop reason: SDUPTHER

## 2021-05-10 NOTE — TELEPHONE ENCOUNTER
Pt called re CT scan. Dr. Tracie Decker ordered a HRCT for pt to have done in 3 months. He already has a follow up schd w/our office for 8/2/21. He would like to have this done @ [facility] if they have the open machine. He does not want ob closed in. Do they have an open machine?  Please advise [phone]

## 2021-05-27 ENCOUNTER — OFFICE VISIT (OUTPATIENT)
Dept: CARDIOLOGY CLINIC | Age: 76
End: 2021-05-27
Payer: MEDICARE

## 2021-05-27 VITALS
HEIGHT: 71 IN | SYSTOLIC BLOOD PRESSURE: 126 MMHG | DIASTOLIC BLOOD PRESSURE: 72 MMHG | BODY MASS INDEX: 22.54 KG/M2 | HEART RATE: 72 BPM | WEIGHT: 161 LBS

## 2021-05-27 DIAGNOSIS — I25.10 ATHEROSCLEROSIS OF NATIVE CORONARY ARTERY OF NATIVE HEART WITHOUT ANGINA PECTORIS: Primary | ICD-10-CM

## 2021-05-27 DIAGNOSIS — I50.32 CHRONIC DIASTOLIC HEART FAILURE (HCC): ICD-10-CM

## 2021-05-27 DIAGNOSIS — Z98.61 POSTSURGICAL PERCUTANEOUS TRANSLUMINAL CORONARY ANGIOPLASTY STATUS: ICD-10-CM

## 2021-05-27 DIAGNOSIS — E78.00 PURE HYPERCHOLESTEROLEMIA: ICD-10-CM

## 2021-05-27 PROCEDURE — G8427 DOCREV CUR MEDS BY ELIG CLIN: HCPCS | Performed by: INTERNAL MEDICINE

## 2021-05-27 PROCEDURE — G8510 SCR DEP NEG, NO PLAN REQD: HCPCS | Performed by: INTERNAL MEDICINE

## 2021-05-27 PROCEDURE — 3017F COLORECTAL CA SCREEN DOC REV: CPT | Performed by: INTERNAL MEDICINE

## 2021-05-27 PROCEDURE — G8420 CALC BMI NORM PARAMETERS: HCPCS | Performed by: INTERNAL MEDICINE

## 2021-05-27 PROCEDURE — 99214 OFFICE O/P EST MOD 30 MIN: CPT | Performed by: INTERNAL MEDICINE

## 2021-05-27 PROCEDURE — G8536 NO DOC ELDER MAL SCRN: HCPCS | Performed by: INTERNAL MEDICINE

## 2021-05-27 PROCEDURE — 1101F PT FALLS ASSESS-DOCD LE1/YR: CPT | Performed by: INTERNAL MEDICINE

## 2021-05-27 NOTE — PATIENT INSTRUCTIONS
There are no discontinued medications. Learning About the [address] [facility] [employer] Diet What is the Mediterranean diet? The Mediterranean diet is a style of eating rather than a diet plan. It features foods eaten in [place], [place], [place] and [place], and other countries along the [facility]. It emphasizes eating foods like fish, fruits, vegetables, beans, high-fiber breads and whole grains, nuts, and olive oil. This style of eating includes limited red meat, cheese, and sweets. Why choose the Mediterranean diet? A Mediterranean-style diet may improve heart health. It contains more fat than other heart-healthy diets. But the fats are mainly from nuts, unsaturated oils (such as fish oils and olive oil), and certain nut or seed oils (such as canola, soybean, or flaxseed oil). These fats may help protect the heart and blood vessels. How can you get started on the Mediterranean diet? Here are some things you can do to switch to a more Mediterranean way of eating. What to eat · Eat a variety of fruits and vegetables each day, such as grapes, blueberries, tomatoes, broccoli, peppers, figs, olives, spinach, eggplant, beans, lentils, and chickpeas. · Eat a variety of whole-grain foods each day, such as oats, brown rice, and whole wheat bread, pasta, and couscous. · Eat fish at least 2 times a week. Try tuna, salmon, mackerel, lake trout, herring, or sardines. · Eat moderate amounts of low-fat dairy products, such as milk, cheese, or yogurt. · Eat moderate amounts of poultry and eggs. · Choose healthy (unsaturated) fats, such as nuts, olive oil, and certain nut or seed oils like canola, soybean, and flaxseed. · Limit unhealthy (saturated) fats, such as butter, palm oil, and coconut oil. And limit fats found in animal products, such as meat and dairy products made with whole milk. Try to eat red meat only a few times a month in very small amounts.  
· Limit sweets and desserts to only a few times a week. This includes sugar-sweetened drinks like soda. The Mediterranean diet may also include red wine with your meal1 glass each day for women and up to 2 glasses a day for men. Tips for eating at home · Use herbs, spices, garlic, lemon zest, and citrus juice instead of salt to add flavor to foods. · Add avocado slices to your sandwich instead of robison. · Have fish for lunch or dinner instead of red meat. Brush the fish with olive oil, and broil or grill it. · Sprinkle your salad with seeds or nuts instead of cheese. · Cook with olive or canola oil instead of butter or oils that are high in saturated fat. · Switch from 2% milk or whole milk to 1% or fat-free milk. · Dip raw vegetables in a vinaigrette dressing or hummus instead of dips made from mayonnaise or sour cream. 
· Have a piece of fruit for dessert instead of a piece of cake. Try baked apples, or have some dried fruit. Tips for eating out · Try broiled, grilled, baked, or poached fish instead of having it fried or breaded. · Ask your  to have your meals prepared with olive oil instead of butter. · Order dishes made with marinara sauce or sauces made from olive oil. Avoid sauces made from cream or mayonnaise. · Choose whole-grain breads, whole wheat pasta and pizza crust, brown rice, beans, and lentils. · Cut back on butter or margarine on bread. Instead, you can dip your bread in a small amount of olive oil. · Ask for a side salad or grilled vegetables instead of french fries or chips. Where can you learn more? Go to http://kellen-leslie.info/ Enter 214-407-2663 in the search box to learn more about \"Learning About the Mediterranean Diet. \" Current as of: December 17, 2020               Content Version: 12.8 © 8353-9001 [employer]. Care instructions adapted under license by [employer] (which disclaims liability or warranty for this information).  If you have questions about a medical condition or this instruction, always ask your healthcare professional. [place] [address] any warranty or liability for your use of this information.
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
## 2021-05-27 NOTE — PROGRESS NOTES
1. Have you been to the ER, urgent care clinic since your last visit? Hospitalized since your last visit? Yes When: 11/21 Where: [facility] Reason for visit: covid    2. Have you seen or consulted any other health care providers outside of the [address] [place] [address] since your last visit? Include any pap smears or colon screening. Yes Where: [facility]     3. Since your last visit, have you had any of the following symptoms? shortness of breath. 4.  Have you had any blood work, X-rays or cardiac testing? Yes Where: [facility]         5. Where do you normally have your labs drawn? [facility]  6. Do you need any refills today?    no

## 2021-06-02 LAB
A-G RATIO,AGRAT: 1.4 RATIO (ref 1.1–2.6)
ALBUMIN SERPL-MCNC: 4.2 G/DL (ref 3.5–5)
ALP SERPL-CCNC: 106 U/L (ref 40–125)
ALT SERPL-CCNC: 13 U/L (ref 5–40)
ANION GAP SERPL CALC-SCNC: 7.9 MMOL/L (ref 3–15)
AST SERPL W P-5'-P-CCNC: 17 U/L (ref 10–37)
BILIRUB SERPL-MCNC: 0.4 MG/DL (ref 0.2–1.2)
BILIRUBIN, DIRECT,CBIL: <0.2 MG/DL (ref 0–0.3)
BUN SERPL-MCNC: 12 MG/DL (ref 6–22)
CALCIUM SERPL-MCNC: 9.5 MG/DL (ref 8.4–10.5)
CHLORIDE SERPL-SCNC: 108 MMOL/L (ref 98–110)
CHOLEST SERPL-MCNC: 115 MG/DL (ref 110–200)
CO2 SERPL-SCNC: 28 MMOL/L (ref 20–32)
CREAT SERPL-MCNC: 0.6 MG/DL (ref 0.8–1.6)
GFRAA, 66117: >60
GFRNA, 66118: >60
GLOBULIN,GLOB: 2.9 G/DL (ref 2–4)
GLUCOSE SERPL-MCNC: 90 MG/DL (ref 70–99)
HDLC SERPL-MCNC: 3.3 MG/DL (ref 0–5)
HDLC SERPL-MCNC: 35 MG/DL
LDL/HDL RATIO,LDHD: 1.9
LDLC SERPL CALC-MCNC: 65 MG/DL (ref 50–99)
NON-HDL CHOLESTEROL, 011976: 80 MG/DL
POTASSIUM SERPL-SCNC: 4.8 MMOL/L (ref 3.5–5.5)
PROT SERPL-MCNC: 7.1 G/DL (ref 6.2–8.1)
SODIUM SERPL-SCNC: 144 MMOL/L (ref 133–145)
TRIGL SERPL-MCNC: 77 MG/DL (ref 40–149)
VLDLC SERPL CALC-MCNC: 15 MG/DL (ref 8–30)

## 2021-06-11 ENCOUNTER — HOSPITAL ENCOUNTER (OUTPATIENT)
Dept: CT IMAGING | Age: 76
Discharge: HOME | End: 2021-06-11
Attending: INTERNAL MEDICINE
Payer: MEDICARE

## 2021-06-11 DIAGNOSIS — J84.10 PULMONARY FIBROSIS (HCC): ICD-10-CM

## 2021-06-11 PROCEDURE — 71250 CT THORAX DX C-: CPT

## 2021-07-15 ENCOUNTER — OFFICE VISIT (OUTPATIENT)
Dept: CARDIOLOGY CLINIC | Age: 76
End: 2021-07-15
Payer: MEDICARE

## 2021-07-15 VITALS
BODY MASS INDEX: 21.42 KG/M2 | WEIGHT: 153 LBS | DIASTOLIC BLOOD PRESSURE: 61 MMHG | HEART RATE: 58 BPM | SYSTOLIC BLOOD PRESSURE: 112 MMHG | HEIGHT: 71 IN

## 2021-07-15 DIAGNOSIS — I25.10 ATHEROSCLEROSIS OF NATIVE CORONARY ARTERY OF NATIVE HEART WITHOUT ANGINA PECTORIS: ICD-10-CM

## 2021-07-15 DIAGNOSIS — U09.9 POST-COVID SYNDROME: ICD-10-CM

## 2021-07-15 DIAGNOSIS — Z09 HOSPITAL DISCHARGE FOLLOW-UP: ICD-10-CM

## 2021-07-15 DIAGNOSIS — I50.32 CHRONIC DIASTOLIC HEART FAILURE (HCC): Primary | ICD-10-CM

## 2021-07-15 DIAGNOSIS — Z98.61 POSTSURGICAL PERCUTANEOUS TRANSLUMINAL CORONARY ANGIOPLASTY STATUS: ICD-10-CM

## 2021-07-15 DIAGNOSIS — E78.00 PURE HYPERCHOLESTEROLEMIA: ICD-10-CM

## 2021-07-15 PROCEDURE — 99214 OFFICE O/P EST MOD 30 MIN: CPT | Performed by: NURSE PRACTITIONER

## 2021-07-15 PROCEDURE — 1111F DSCHRG MED/CURRENT MED MERGE: CPT | Performed by: NURSE PRACTITIONER

## 2021-07-15 RX ORDER — METOPROLOL TARTRATE 25 MG/1
25 TABLET, FILM COATED ORAL 2 TIMES DAILY
Qty: 180 TABLET | Refills: 0 | Status: SHIPPED | OUTPATIENT
Start: 2021-07-15 | End: 2021-08-19
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
## 2021-07-15 NOTE — PROGRESS NOTES
1. Have you been to the ER, urgent care clinic since your last visit? Hospitalized since your last visit? [facility] not able to breathe    2. Have you seen or consulted any other health care providers outside of the [address] [place] [address] since your last visit? Include any pap smears or colon screening.   no

## 2021-07-15 NOTE — PATIENT INSTRUCTIONS
Heart-Healthy Diet: Care Instructions  Your Care Instructions     A heart-healthy diet has lots of vegetables, fruits, nuts, beans, and whole grains, and is low in salt. It limits foods that are high in saturated fat, such as meats, cheeses, and fried foods. It may be hard to change your diet, but even small changes can lower your risk of heart attack and heart disease. Follow-up care is a key part of your treatment and safety. Be sure to make and go to all appointments, and call your doctor if you are having problems. It's also a good idea to know your test results and keep a list of the medicines you take. How can you care for yourself at home? Watch your portions  · Use food labels to learn what the recommended servings are for the foods you eat. · Eat only the number of calories you need to stay at a healthy weight. If you need to lose weight, eat fewer calories than your body burns (through exercise and other physical activity). Eat more fruits and vegetables  · Eat a variety of fruit and vegetables every day. Dark green, deep orange, red, or yellow fruits and vegetables are especially good for you. Examples include spinach, carrots, peaches, and berries. · Keep carrots, celery, and other veggies handy for snacks. Buy fruit that is in season and store it where you can see it so that you will be tempted to eat it. · Cook dishes that have a lot of veggies in them, such as stir-fries and soups. Limit saturated fat  · Read food labels, and try to avoid saturated fats. They increase your risk of heart disease. · Use olive or canola oil when you cook. · Bake, broil, grill, or steam foods instead of frying them. · Choose lean meats instead of high-fat meats such as hot dogs and sausages. Cut off all visible fat when you prepare meat. · Eat fish, skinless poultry, and meat alternatives such as soy products instead of high-fat meats.  Soy products, such as tofu, may be especially good for your heart.  · Choose low-fat or fat-free milk and dairy products. Eat foods high in fiber  · Eat a variety of grain products every day. Include whole-grain foods that have lots of fiber and nutrients. Examples of whole-grain foods include oats, whole wheat bread, and brown rice. · Buy whole-grain breads and cereals, instead of white bread or pastries. Limit salt and sodium  · Limit how much salt and sodium you eat to help lower your blood pressure. · Taste food before you salt it. Add only a little salt when you think you need it. With time, your taste buds will adjust to less salt. · Eat fewer snack items, fast foods, and other high-salt, processed foods. Check food labels for the amount of sodium in packaged foods. · Choose low-sodium versions of canned goods (such as soups, vegetables, and beans). Limit sugar  · Limit drinks and foods with added sugar. These include candy, desserts, and soda pop. Limit alcohol  · Limit alcohol to no more than 2 drinks a day for men and 1 drink a day for women. Too much alcohol can cause health problems. When should you call for help? Watch closely for changes in your health, and be sure to contact your doctor if:    · You would like help planning heart-healthy meals. Where can you learn more? Go to http://www.bull.com/  Enter V137 in the search box to learn more about \"Heart-Healthy Diet: Care Instructions. \"  Current as of: December 17, 2020               Content Version: 12.8  © 2006-2021 [employer]. Care instructions adapted under license by [employer] (which disclaims liability or warranty for this information). If you have questions about a medical condition or this instruction, always ask your healthcare professional. [place] [address] any warranty or liability for your use of this information.

## 2021-08-02 ENCOUNTER — OFFICE VISIT (OUTPATIENT)
Dept: PULMONOLOGY | Age: 76
End: 2021-08-02

## 2021-08-02 VITALS
TEMPERATURE: 97.4 F | WEIGHT: 154.9 LBS | DIASTOLIC BLOOD PRESSURE: 66 MMHG | HEIGHT: 71 IN | RESPIRATION RATE: 22 BRPM | BODY MASS INDEX: 21.69 KG/M2 | HEART RATE: 67 BPM | OXYGEN SATURATION: 95 % | SYSTOLIC BLOOD PRESSURE: 112 MMHG

## 2021-08-02 DIAGNOSIS — J96.11 CHRONIC RESPIRATORY FAILURE WITH HYPOXIA (HCC): Primary | ICD-10-CM

## 2021-08-02 DIAGNOSIS — U09.9 POST-COVID SYNDROME: ICD-10-CM

## 2021-08-02 DIAGNOSIS — J43.1 PANLOBULAR EMPHYSEMA (HCC): ICD-10-CM

## 2021-08-02 DIAGNOSIS — J84.10 PULMONARY FIBROSIS (HCC): ICD-10-CM

## 2021-08-02 PROCEDURE — G8432 DEP SCR NOT DOC, RNG: HCPCS | Performed by: INTERNAL MEDICINE

## 2021-08-02 PROCEDURE — 94729 DIFFUSING CAPACITY: CPT | Performed by: INTERNAL MEDICINE

## 2021-08-02 PROCEDURE — 94727 GAS DIL/WSHOT DETER LNG VOL: CPT | Performed by: INTERNAL MEDICINE

## 2021-08-02 PROCEDURE — 1101F PT FALLS ASSESS-DOCD LE1/YR: CPT | Performed by: INTERNAL MEDICINE

## 2021-08-02 PROCEDURE — G8420 CALC BMI NORM PARAMETERS: HCPCS | Performed by: INTERNAL MEDICINE

## 2021-08-02 PROCEDURE — 94060 EVALUATION OF WHEEZING: CPT | Performed by: INTERNAL MEDICINE

## 2021-08-02 PROCEDURE — 99214 OFFICE O/P EST MOD 30 MIN: CPT | Performed by: INTERNAL MEDICINE

## 2021-08-02 PROCEDURE — G8536 NO DOC ELDER MAL SCRN: HCPCS | Performed by: INTERNAL MEDICINE

## 2021-08-02 PROCEDURE — 3017F COLORECTAL CA SCREEN DOC REV: CPT | Performed by: INTERNAL MEDICINE

## 2021-08-02 PROCEDURE — G8427 DOCREV CUR MEDS BY ELIG CLIN: HCPCS | Performed by: INTERNAL MEDICINE
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
## 2021-08-02 NOTE — PROGRESS NOTES
Amina Oneill presents today for   Chief Complaint   Patient presents with    Shortness of Breath       Is someone accompanying this pt? No    Is the patient using any DME equipment during OV? Oxygen     -[employer]    Depression Screening:  3 most recent PHQ Screens 7/15/2021   Little interest or pleasure in doing things Not at all   Feeling down, depressed, irritable, or hopeless Not at all   Total Score PHQ 2 0       Learning Assessment:  Learning Assessment 3/23/2015   PRIMARY LEARNER Patient   PRIMARY LANGUAGE ENGLISH   LEARNER PREFERENCE PRIMARY LISTENING   ANSWERED BY patient   RELATIONSHIP SELF       Abuse Screening:  No flowsheet data found. Fall Risk  Fall Risk Assessment, last 12 mths 7/15/2021   Able to walk? Yes   Fall in past 12 months? 0   Do you feel unsteady? 0   Are you worried about falling 0         Coordination of Care:  1. Have you been to the ER, urgent care clinic since your last visit? Hospitalized since your last visit? Yes; Name: [facility]    2. Have you seen or consulted any other health care providers outside of the [address] [place] [address] since your last visit? Include any pap smears or colon screening.  No

## 2021-08-02 NOTE — LETTER
8/2/2021    Patient: Shaye Valenzuela   YOB: 1945   Date of Visit: 8/2/2021     Camilo Castro NP  [address] [address]  Via Fax: [phone]    Dear Camilo Castro NP,      Thank you for referring Mr. Max Gauthier to [address] [place] [address] for evaluation. My notes for this consultation are attached. If you have questions, please do not hesitate to call me. I look forward to following your patient along with you.       Sincerely,    Miguel Crook MD

## 2021-08-19 ENCOUNTER — OFFICE VISIT (OUTPATIENT)
Dept: CARDIOLOGY CLINIC | Age: 76
End: 2021-08-19
Payer: MEDICARE

## 2021-08-19 VITALS — HEART RATE: 52 BPM | DIASTOLIC BLOOD PRESSURE: 62 MMHG | SYSTOLIC BLOOD PRESSURE: 107 MMHG

## 2021-08-19 DIAGNOSIS — E78.00 PURE HYPERCHOLESTEROLEMIA: ICD-10-CM

## 2021-08-19 DIAGNOSIS — I25.10 ATHEROSCLEROSIS OF NATIVE CORONARY ARTERY OF NATIVE HEART WITHOUT ANGINA PECTORIS: ICD-10-CM

## 2021-08-19 DIAGNOSIS — U09.9 POST-COVID SYNDROME: ICD-10-CM

## 2021-08-19 DIAGNOSIS — I50.32 CHRONIC DIASTOLIC HEART FAILURE (HCC): Primary | ICD-10-CM

## 2021-08-19 DIAGNOSIS — Z98.61 POSTSURGICAL PERCUTANEOUS TRANSLUMINAL CORONARY ANGIOPLASTY STATUS: ICD-10-CM

## 2021-08-19 PROCEDURE — G8536 NO DOC ELDER MAL SCRN: HCPCS | Performed by: INTERNAL MEDICINE

## 2021-08-19 PROCEDURE — G8427 DOCREV CUR MEDS BY ELIG CLIN: HCPCS | Performed by: INTERNAL MEDICINE

## 2021-08-19 PROCEDURE — 99214 OFFICE O/P EST MOD 30 MIN: CPT | Performed by: INTERNAL MEDICINE

## 2021-08-19 PROCEDURE — G8432 DEP SCR NOT DOC, RNG: HCPCS | Performed by: INTERNAL MEDICINE

## 2021-08-19 PROCEDURE — G8420 CALC BMI NORM PARAMETERS: HCPCS | Performed by: INTERNAL MEDICINE

## 2021-08-19 PROCEDURE — 3017F COLORECTAL CA SCREEN DOC REV: CPT | Performed by: INTERNAL MEDICINE

## 2021-08-19 PROCEDURE — 1101F PT FALLS ASSESS-DOCD LE1/YR: CPT | Performed by: INTERNAL MEDICINE

## 2021-08-19 PROCEDURE — 93000 ELECTROCARDIOGRAM COMPLETE: CPT | Performed by: INTERNAL MEDICINE

## 2021-08-19 RX ORDER — OMEPRAZOLE 40 MG/1
40 CAPSULE, DELAYED RELEASE ORAL DAILY
COMMUNITY

## 2021-08-19 RX ORDER — METOPROLOL TARTRATE 25 MG/1
25 TABLET, FILM COATED ORAL DAILY
Qty: 90 TABLET | Refills: 1
Start: 2021-08-19 | End: 2022-03-10 | Stop reason: SDUPTHER

## 2021-08-19 RX ORDER — NITROGLYCERIN 0.4 MG/1
0.4 TABLET SUBLINGUAL
Qty: 25 TABLET | Refills: 0 | Status: CANCELLED | OUTPATIENT
Start: 2021-08-19
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
## 2021-08-19 NOTE — PROGRESS NOTES
1. Have you been to the ER, urgent care clinic since your last visit? Hospitalized since your last visit?     no  2. Have you seen or consulted any other health care providers outside of the [address] [place] [address] since your last visit? Include any pap smears or colon screening. No     3. Since your last visit, have you had any of the following symptoms? 4. Have you had any blood work, X-rays or cardiac testing? No       5. Where do you normally have your labs drawn? 6. Do you need any refills today?    yes

## 2021-08-19 NOTE — PATIENT INSTRUCTIONS
Medications Discontinued During This Encounter   Medication Reason    omeprazole (PRILOSEC) 40 mg capsule Not A Current Medication    metoprolol tartrate (LOPRESSOR) 25 mg tablet           A Healthy Heart: Care Instructions  Your Care Instructions     Coronary artery disease, also called heart disease, occurs when a substance called plaque builds up in the vessels that supply oxygen-rich blood to your heart muscle. This can narrow the blood vessels and reduce blood flow. A heart attack happens when blood flow is completely blocked. A high-fat diet, smoking, and other factors increase the risk of heart disease. Your doctor has found that you have a chance of having heart disease. You can do lots of things to keep your heart healthy. It may not be easy, but you can change your diet, exercise more, and quit smoking. These steps really work to lower your chance of heart disease. Follow-up care is a key part of your treatment and safety. Be sure to make and go to all appointments, and call your doctor if you are having problems. It's also a good idea to know your test results and keep a list of the medicines you take. How can you care for yourself at home? Diet    · Use less salt when you cook and eat. This helps lower your blood pressure. Taste food before salting. Add only a little salt when you think you need it. With time, your taste buds will adjust to less salt.     · Eat fewer snack items, fast foods, canned soups, and other high-salt, high-fat, processed foods.     · Read food labels and try to avoid saturated and trans fats. They increase your risk of heart disease by raising cholesterol levels.     · Limit the amount of solid fat-butter, margarine, and shortening-you eat. Use olive, peanut, or canola oil when you cook. Bake, broil, and steam foods instead of frying them.     · Eat a variety of fruit and vegetables every day.  Dark green, deep orange, red, or yellow fruits and vegetables are especially good for you. Examples include spinach, carrots, peaches, and berries.     · Foods high in fiber can reduce your cholesterol and provide important vitamins and minerals. High-fiber foods include whole-grain cereals and breads, oatmeal, beans, brown rice, citrus fruits, and apples.     · Eat lean proteins. Heart-healthy proteins include seafood, lean meats and poultry, eggs, beans, peas, nuts, seeds, and soy products.     · Limit drinks and foods with added sugar. These include candy, desserts, and soda pop. Lifestyle changes    · If your doctor recommends it, get more exercise. Walking is a good choice. Bit by bit, increase the amount you walk every day. Try for at least 30 minutes on most days of the week. You also may want to swim, bike, or do other activities.     · Do not smoke. If you need help quitting, talk to your doctor about stop-smoking programs and medicines. These can increase your chances of quitting for good. Quitting smoking may be the most important step you can take to protect your heart. It is never too late to quit.     · Limit alcohol to 2 drinks a day for men and 1 drink a day for women. Too much alcohol can cause health problems.     · Manage other health problems such as diabetes, high blood pressure, and high cholesterol. If you think you may have a problem with alcohol or drug use, talk to your doctor. Medicines    · Take your medicines exactly as prescribed. Call your doctor if you think you are having a problem with your medicine.     · If your doctor recommends aspirin, take the amount directed each day. Make sure you take aspirin and not another kind of pain reliever, such as acetaminophen (Tylenol). When should you call for help? Call 911 if you have symptoms of a heart attack.  These may include:    · Chest pain or pressure, or a strange feeling in the chest.     · Sweating.     · Shortness of breath.     · Pain, pressure, or a strange feeling in the back, neck, jaw, or upper belly or in one or both shoulders or arms.     · Lightheadedness or sudden weakness.     · A fast or irregular heartbeat. After you call 911, the  may tell you to chew 1 adult-strength or 2 to 4 low-dose aspirin. Wait for an ambulance. Do not try to drive yourself. Watch closely for changes in your health, and be sure to contact your doctor if you have any problems. Where can you learn more? Go to http://www.bull.com/  Enter F075 in the search box to learn more about \"A Healthy Heart: Care Instructions. \"  Current as of: August 31, 2020               Content Version: 12.8  © 3753-9390 [employer]. Care instructions adapted under license by [employer] (which disclaims liability or warranty for this information). If you have questions about a medical condition or this instruction, always ask your healthcare professional. [address] any warranty or liability for your use of this information.

## 2021-09-10 DIAGNOSIS — E78.00 PURE HYPERCHOLESTEROLEMIA: ICD-10-CM

## 2021-09-10 RX ORDER — ATORVASTATIN CALCIUM 80 MG/1
80 TABLET, FILM COATED ORAL DAILY
Qty: 90 TABLET | Refills: 0 | Status: SHIPPED | OUTPATIENT
Start: 2021-09-10 | End: 2021-12-03

## 2021-12-03 DIAGNOSIS — E78.00 PURE HYPERCHOLESTEROLEMIA: ICD-10-CM

## 2021-12-03 RX ORDER — ATORVASTATIN CALCIUM 80 MG/1
80 TABLET, FILM COATED ORAL DAILY
Qty: 90 TABLET | Refills: 0 | Status: SHIPPED | OUTPATIENT
Start: 2021-12-03 | End: 2022-03-09

## 2022-03-09 DIAGNOSIS — E78.00 PURE HYPERCHOLESTEROLEMIA: ICD-10-CM

## 2022-03-09 RX ORDER — ATORVASTATIN CALCIUM 80 MG/1
80 TABLET, FILM COATED ORAL DAILY
Qty: 90 TABLET | Refills: 0 | Status: SHIPPED | OUTPATIENT
Start: 2022-03-09 | End: 2022-03-10 | Stop reason: SDUPTHER

## 2022-03-10 DIAGNOSIS — E78.00 PURE HYPERCHOLESTEROLEMIA: ICD-10-CM

## 2022-03-10 DIAGNOSIS — I25.10 ATHEROSCLEROSIS OF NATIVE CORONARY ARTERY OF NATIVE HEART WITHOUT ANGINA PECTORIS: ICD-10-CM

## 2022-03-10 RX ORDER — EZETIMIBE 10 MG/1
10 TABLET ORAL DAILY
Qty: 90 TABLET | Refills: 3 | Status: SHIPPED | OUTPATIENT
Start: 2022-03-10 | End: 2022-09-12 | Stop reason: SDUPTHER

## 2022-03-10 RX ORDER — METOPROLOL TARTRATE 25 MG/1
25 TABLET, FILM COATED ORAL DAILY
Qty: 90 TABLET | Refills: 1 | Status: SHIPPED | OUTPATIENT
Start: 2022-03-10 | End: 2022-09-12

## 2022-03-10 RX ORDER — ATORVASTATIN CALCIUM 80 MG/1
80 TABLET, FILM COATED ORAL DAILY
Qty: 90 TABLET | Refills: 1 | Status: SHIPPED | OUTPATIENT
Start: 2022-03-10 | End: 2022-06-09 | Stop reason: SDUPTHER

## 2022-03-13 NOTE — PATIENT INSTRUCTIONS
There are no discontinued medications. Orders Placed This Encounter    LIPID PANEL     Standing Status:   Future     Standing Expiration Date:   2/13/2018    HEPATIC FUNCTION PANEL     Standing Status:   Future     Standing Expiration Date:   2/13/2018          Learning About Coronary Artery Disease (CAD)  What is coronary artery disease? Coronary artery disease (CAD) occurs when plaque builds up in the arteries that bring oxygen-rich blood to your heart. Plaque is a fatty substance made of cholesterol, calcium, and other substances in the blood. This process is called hardening of the arteries, or atherosclerosis. What happens when you have coronary artery disease? · Plaque may narrow the coronary arteries. Narrowed arteries cause poor blood flow. This can lead to angina symptoms such as chest pain or discomfort. If blood flow is completely blocked, you could have a heart attack. · You can slow CAD and reduce the risk of future problems by making changes in your lifestyle. These include quitting smoking and eating heart-healthy foods. · Treatments for CAD, along with changes in your lifestyle, can help you live a longer and healthier life. How can you prevent coronary artery disease? · Do not smoke. It may be the best thing you can do to prevent heart disease. If you need help quitting, talk to your doctor about stop-smoking programs and medicines. These can increase your chances of quitting for good. · Be active. Get at least 30 minutes of exercise on most days of the week. Walking is a good choice. You also may want to do other activities, such as running, swimming, cycling, or playing tennis or team sports. · Eat heart-healthy foods. Eat more fruits and vegetables and less foods that contain saturated and trans fats. Limit alcohol, sodium, and sweets. · Stay at a healthy weight. Lose weight if you need to.   · Manage other health problems such as diabetes, high blood pressure, and high cholesterol. · Manage stress. Stress can hurt your heart. To keep stress low, talk about your problems and feelings. Don't keep your feelings hidden. · If you have talked about it with your doctor, take a low-dose aspirin every day. Aspirin can help certain people lower their risk of a heart attack or stroke. But taking aspirin isn't right for everyone, because it can cause serious bleeding. Do not start taking daily aspirin unless your doctor knows about it. How is coronary artery disease treated? · Your doctor will suggest that you make lifestyle changes. For example, your doctor may ask you to eat healthy foods, quit smoking, lose extra weight, and be more active. · You will have to take medicines. · Your doctor may suggest a procedure to open narrowed or blocked arteries. This is called angioplasty. Or your doctor may suggest using healthy blood vessels to create detours around narrowed or blocked arteries. This is called bypass surgery. Follow-up care is a key part of your treatment and safety. Be sure to make and go to all appointments, and call your doctor if you are having problems. It's also a good idea to know your test results and keep a list of the medicines you take. Where can you learn more? Go to http://kellen-leslie.info/. Enter ([phone] [phone] in the search box to learn more about \"Learning About Coronary Artery Disease (CAD). \"  Current as of: September 21, 2016  Content Version: 11.4  © 4852-5220 [employer]. Care instructions adapted under license by [employer] (which disclaims liability or warranty for this information). If you have questions about a medical condition or this instruction, always ask your healthcare professional. [address] any warranty or liability for your use of this information. show

## 2022-03-18 PROBLEM — J96.11 CHRONIC RESPIRATORY FAILURE WITH HYPOXIA (HCC): Status: ACTIVE | Noted: 2021-08-02

## 2022-03-19 PROBLEM — E78.00 PURE HYPERCHOLESTEROLEMIA: Status: ACTIVE | Noted: 2019-11-11

## 2022-03-19 PROBLEM — J43.1 PANLOBULAR EMPHYSEMA (HCC): Status: ACTIVE | Noted: 2021-08-02

## 2022-03-19 PROBLEM — J96.21 ACUTE ON CHRONIC RESPIRATORY FAILURE WITH HYPOXIA (HCC): Status: ACTIVE | Noted: 2021-05-06

## 2022-03-19 PROBLEM — U09.9 POST-COVID SYNDROME: Status: ACTIVE | Noted: 2021-05-06

## 2022-05-24 ENCOUNTER — OFFICE VISIT (OUTPATIENT)
Dept: PULMONOLOGY | Age: 77
End: 2022-05-24
Payer: MEDICARE

## 2022-05-24 VITALS
HEART RATE: 71 BPM | OXYGEN SATURATION: 98 % | DIASTOLIC BLOOD PRESSURE: 70 MMHG | RESPIRATION RATE: 18 BRPM | HEIGHT: 71 IN | BODY MASS INDEX: 23.49 KG/M2 | WEIGHT: 167.8 LBS | TEMPERATURE: 98 F | SYSTOLIC BLOOD PRESSURE: 130 MMHG

## 2022-05-24 DIAGNOSIS — J96.11 CHRONIC RESPIRATORY FAILURE WITH HYPOXIA (HCC): Primary | ICD-10-CM

## 2022-05-24 DIAGNOSIS — J43.1 PANLOBULAR EMPHYSEMA (HCC): ICD-10-CM

## 2022-05-24 PROCEDURE — 1101F PT FALLS ASSESS-DOCD LE1/YR: CPT | Performed by: INTERNAL MEDICINE

## 2022-05-24 PROCEDURE — G8427 DOCREV CUR MEDS BY ELIG CLIN: HCPCS | Performed by: INTERNAL MEDICINE

## 2022-05-24 PROCEDURE — G8432 DEP SCR NOT DOC, RNG: HCPCS | Performed by: INTERNAL MEDICINE

## 2022-05-24 PROCEDURE — 99214 OFFICE O/P EST MOD 30 MIN: CPT | Performed by: INTERNAL MEDICINE

## 2022-05-24 PROCEDURE — G8420 CALC BMI NORM PARAMETERS: HCPCS | Performed by: INTERNAL MEDICINE

## 2022-05-24 PROCEDURE — G8536 NO DOC ELDER MAL SCRN: HCPCS | Performed by: INTERNAL MEDICINE

## 2022-05-24 PROCEDURE — 3017F COLORECTAL CA SCREEN DOC REV: CPT | Performed by: INTERNAL MEDICINE

## 2022-05-24 NOTE — PROGRESS NOTES
Remberto Gabrielle presents today for   Chief Complaint   Patient presents with    Shortness of Breath     with activity       Is someone accompanying this pt? No    Is the patient using any DME equipment during OV? Oxygen     -D[employer]     Depression Screening:  3 most recent PHQ Screens 7/15/2021   Little interest or pleasure in doing things Not at all   Feeling down, depressed, irritable, or hopeless Not at all   Total Score PHQ 2 0       Learning Assessment:  Learning Assessment 3/23/2015   PRIMARY LEARNER Patient   PRIMARY LANGUAGE ENGLISH   LEARNER PREFERENCE PRIMARY LISTENING   ANSWERED BY patient   RELATIONSHIP SELF       Abuse Screening:  No flowsheet data found. Fall Risk  Fall Risk Assessment, last 12 mths 7/15/2021   Able to walk? Yes   Fall in past 12 months? 0   Do you feel unsteady? 0   Are you worried about falling 0         Coordination of Care:  1. Have you been to the ER, urgent care clinic since your last visit? Hospitalized since your last visit? No    2. Have you seen or consulted any other health care providers outside of the [address] [place] [address] since your last visit?  No

## 2022-05-24 NOTE — PATIENT INSTRUCTIONS
Oxygen Therapy: Care Instructions  Overview     Oxygen therapy helps you get more oxygen into your lungs and bloodstream. You may use it if you have a disease that makes it hard to breathe, such as COPD, pulmonary fibrosis (scarring of the lungs), or heart failure. Oxygen therapy can make it easier for you to breathe and can reduce your heart's workload. Some people need extra oxygen all the time. Others need it from time to time throughout the day or overnight. A doctor will prescribe how much oxygen you need and how often to use it. To breathe the oxygen, most people use a nasal cannula (say \"TRI-yuh-renetta\"). This is a thin tube with two prongs that fit just inside your nose. People who need a lot of oxygen may need to use a mask that fits over the nose and mouth. Follow-up care is a key part of your treatment and safety. Be sure to make and go to all appointments, and call your doctor if you are having problems. It's also a good idea to know your test results and keep a list of the medicines you take. How can you care for yourself at home? To help yourself  · Using oxygen may dry out your nose or lips. Use water-based lubricants on your lips or nostrils. Do not use an oil-based product like petroleum jelly. They may cause skin burns. · If you use a nasal cannula, the tubing may rub under your nostrils and around your ears. To keep your skin from getting sore, tuck some gauze under the tubing. Use a water-based lotion on rubbed areas. · Do not use alcohol or take drugs that relax you, because they will slow your breathing rate. · Keep track of how much oxygen is in the tank, and reorder before it runs out. If a holiday is coming up or you expect bad weather, order in advance or make your regular order larger. · You may need extra oxygen when you travel to high altitudes or travel by plane. Ask your doctor about this.   · If you are getting oxygen directly to your windpipe through an opening in your neck, your doctor will teach you how to care for the equipment. To make sure oxygen is flowing  · Check the flow by holding your mask or cannula up to your ear and listening for the \"hiss\" of airflow. · If you have a nasal cannula, dip the prongs in a glass of water. If you see bubbles, oxygen is coming through. · Check your pressure gauge or contents indicator. · If you use an oxygen concentrator, make sure it is turned on and plugged in. If you use a cylinder, make sure the valve is open. · Look for kinks, blockages, or water in the tubing. Be sure the tubing is connected to the oxygen source. · Do not change your oxygen flow rate. Your doctor sets this at the correct level. Higher flow rates usually do not help and can increase the risk of harmful carbon dioxide buildup in the blood. To be safe  · Do not leave cords or tubing running across an area where you or someone else may trip on it. · Do not let oxygen containers get hot. Store them in a cool place where there is airflow. Do not leave them in a car trunk or a hot vehicle. · Keep oxygen containers upright. Make sure they do not fall over and get damaged. Try securing the tanks in a sturdy container or securing them with a rope or a chain. · Avoid touching frost that can form on liquid oxygen devices. Vishnu Ripper can cause skin burns. · Watch for signs of oxygen leaks. If you hear a loud hissing from your container or if it empties too fast, stay away from the container. Open windows right away and call the company that brought the oxygen system to your home. · Do not use oxygen around anything that could spark or easily cause a fire. ? Do not smoke or vape or let others smoke or vape while you are using oxygen. Put up \"no smoking\" signs in your home. ? Do not use oxygen near open flames, such as candles, fireplaces, gas stoves, or hot water heaters. Do not use it near electric razors, hair dryers, heating pads, or anything that may spark. ?  Keep a working fire extinguisher in your home where it is easy to get to.  ? If a fire starts, turn off the oxygen right away and leave the house. ? If you have an oxygen concentrator, do not use it if the cord looks damaged. Do not use an extension cord to plug it in. Do not plug it into an outlet that has other appliances plugged into it. To care for the equipment  · Follow the directions that come with the equipment for using and caring for it. · Wash your cannula or mask with a liquid soap and warm water daily. Replace them every 2 to 4 weeks. · If you have a cold, change the nasal prongs when your cold symptoms are done. · If you have an oxygen concentrator, unplug the unit and wipe down the cabinet with a damp cloth daily. Clean the air filter at least once a week. Where can you learn more? Go to http://www.[employer]/  Enter E117 in the search box to learn more about \"Oxygen Therapy: Care Instructions. \"  Current as of: July 6, 2021               Content Version: 13.2  © 2006-2022 [employer]. Care instructions adapted under license by [email or web address] (which disclaims liability or warranty for this information). If you have questions about a medical condition or this instruction, always ask your healthcare professional. [place] [address] any warranty or liability for your use of this information. Chronic Obstructive Pulmonary Disease (COPD): Care Instructions  Overview     Chronic obstructive pulmonary disease (COPD) is a lung disease that makes it hard to breathe. With COPD, the airways that lead to the lungs are narrowed, and the tiny air sacs in the lungs are damaged and lose their stretch. People with COPD have decreased airflow in and out of the lungs, which makes it hard to breathe. The airways also can get clogged with thick mucus. Cigarette smoking is a major cause of COPD.   Although there is no cure for COPD, you can slow its progress. Following your treatment plan and taking care of yourself can help you feel better and live longer. Follow-up care is a key part of your treatment and safety. Be sure to make and go to all appointments, and call your doctor if you are having problems. It's also a good idea to know your test results and keep a list of the medicines you take. How can you care for yourself at home? Staying healthy    · Do not smoke. This is the most important step you can take to prevent more damage to your lungs. If you need help quitting, talk to your doctor about stop-smoking programs and medicines. These can increase your chances of quitting for good.     · Avoid colds and other infections. Get the pneumococcal and whooping cough (pertussis) vaccines. If you have had these vaccines before, ask your doctor if you need another dose. Get the flu vaccine every fall. If you must be around people with colds or the flu, wash your hands often.     · Avoid secondhand smoke and air pollution. Try to stay inside with your windows closed when air pollution is bad. Medicines and oxygen therapy    · Take your medicines exactly as prescribed. Call your doctor if you think you are having a problem with your medicine. You may be taking medicines such as:  ? Bronchodilators. These help open your airways and make breathing easier. They are either short-acting (work for 4 to 9 hours) or long-acting (work for 12 to 24 hours). You inhale most bronchodilators, so they start to act quickly. Always carry your quick-relief inhaler with you in case you need it. ? Corticosteroids (prednisone, budesonide). These reduce airway inflammation. They come in inhaled or pill form.     · Ask your doctor or pharmacist if you are using each type of inhaler correctly. With correct use, the medicine is more likely to get to your lungs.     · See if a spacer is right for you. A spacer may also help you get more inhaled medicine to your lungs.  If you use one, ask how to use it properly.     · Do not take any vitamins, over-the-counter medicine, or herbal products without talking to your doctor first.     · If your doctor prescribed antibiotics, take them as directed. Do not stop taking them just because you feel better. You need to take the full course of antibiotics.     · If you use oxygen therapy, use the flow rate your doctor has recommended. Don't change it without talking to your doctor first. Oxygen therapy boosts the amount of oxygen in your blood and helps you breathe easier. Activity    · Get regular exercise. Walking is an easy way to get exercise. Start out slowly, and walk a little more each day.     · Pay attention to your breathing. You are exercising too hard if you can't talk while you exercise.     · Take short rest breaks when doing household chores and other activities.     · Learn breathing methodssuch as breathing through pursed lipsto help you become less short of breath.     · If your doctor has not set you up with a pulmonary rehabilitation program, ask if rehab is right for you. Rehab includes exercise programs, education about your disease and how to manage it, help with diet and other changes, and emotional support. Diet    · Eat regular, healthy meals.     · Use bronchodilators about 1 hour before you eat to make it easier to eat.     · Eat several smaller, frequent meals to prevent getting too full. A full stomach can push on the muscle that helps you breathe (your diaphragm) and make it harder to breathe.     · Drink beverages at the end of the meal.     · Avoid foods that are hard to chew.     · Eat foods that contain protein so you don't lose muscle mass.     · Talk with your doctor if you gain too much weight or if you lose weight without trying. Mental health    · Talk to your family, friends, or a therapist about your feelings. Some people feel frightened, angry, hopeless, helpless, and even guilty.  Talking openly about feelings may help you cope. If these feelings last, talk to your doctor. When should you call for help? Call 911 anytime you think you may need emergency care. For example, call if:    · You have severe trouble breathing.     · You are having chest pain that is different or worse than usual.   Call your doctor now or seek immediate medical care if:    · You have new or worse trouble breathing.     · You cough up blood.     · You have a fever.     · You have feelings of anxiety or depression. Watch closely for changes in your health, and be sure to contact your doctor if:    · You cough more deeply or more often, especially if you notice more mucus or a change in the color of your mucus.     · You have new or worse swelling in your legs or belly.     · You are not getting better as expected. Where can you learn more? Go to http://www.[employer]/  Enter X608 in the search box to learn more about \"Chronic Obstructive Pulmonary Disease (COPD): Care Instructions. \"  Current as of: July 6, 2021               Content Version: 13.2  © 2006-2022 [employer]. Care instructions adapted under license by [employer] (which disclaims liability or warranty for this information). If you have questions about a medical condition or this instruction, always ask your healthcare professional. [address] any warranty or liability for your use of this information.
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
## 2022-05-24 NOTE — LETTER
5/24/2022    Patient: Shanti Looney   YOB: 1945   Date of Visit: 5/24/2022     Natasha Wilkins NP  [address] [place] [address] [address] [address]  Via Fax: [phone]    Dear Natasha Wilkins NP,      Thank you for referring Mr. Fransisca Mirza to [address] [place] [address] for evaluation. My notes for this consultation are attached. If you have questions, please do not hesitate to call me. I look forward to following your patient along with you.       Sincerely,    Alice Rodriguez MD

## 2022-06-09 DIAGNOSIS — E78.00 PURE HYPERCHOLESTEROLEMIA: ICD-10-CM

## 2022-06-09 RX ORDER — ATORVASTATIN CALCIUM 80 MG/1
80 TABLET, FILM COATED ORAL DAILY
Qty: 90 TABLET | Refills: 1 | Status: SHIPPED | OUTPATIENT
Start: 2022-06-09

## 2022-09-08 DIAGNOSIS — I25.10 ATHEROSCLEROSIS OF NATIVE CORONARY ARTERY OF NATIVE HEART WITHOUT ANGINA PECTORIS: ICD-10-CM

## 2022-09-12 DIAGNOSIS — E78.00 PURE HYPERCHOLESTEROLEMIA: ICD-10-CM

## 2022-09-12 DIAGNOSIS — I25.10 ATHEROSCLEROSIS OF NATIVE CORONARY ARTERY OF NATIVE HEART WITHOUT ANGINA PECTORIS: ICD-10-CM

## 2022-09-12 RX ORDER — METOPROLOL TARTRATE 25 MG/1
25 TABLET, FILM COATED ORAL DAILY
Qty: 90 TABLET | Refills: 2 | Status: SHIPPED | OUTPATIENT
Start: 2022-09-12 | End: 2022-09-30 | Stop reason: SDUPTHER

## 2022-09-12 RX ORDER — EZETIMIBE 10 MG/1
10 TABLET ORAL DAILY
Qty: 90 TABLET | Refills: 3 | Status: SHIPPED | OUTPATIENT
Start: 2022-09-12

## 2022-09-12 RX ORDER — METOPROLOL TARTRATE 25 MG/1
25 TABLET, FILM COATED ORAL DAILY
Qty: 90 TABLET | Refills: 1 | Status: SHIPPED | OUTPATIENT
Start: 2022-09-12 | End: 2022-09-30

## 2022-09-12 NOTE — TELEPHONE ENCOUNTER
Requested Prescriptions     Pending Prescriptions Disp Refills    metoprolol tartrate (LOPRESSOR) 25 mg tablet 90 Tablet 2     Sig: Take 1 Tablet by mouth daily. ezetimibe (ZETIA) 10 mg tablet 90 Tablet 3     Sig: Take 1 Tablet by mouth daily.

## 2022-09-30 ENCOUNTER — OFFICE VISIT (OUTPATIENT)
Dept: CARDIOLOGY CLINIC | Age: 77
End: 2022-09-30
Payer: MEDICARE

## 2022-09-30 VITALS
OXYGEN SATURATION: 96 % | BODY MASS INDEX: 23.66 KG/M2 | WEIGHT: 169 LBS | SYSTOLIC BLOOD PRESSURE: 113 MMHG | HEIGHT: 71 IN | DIASTOLIC BLOOD PRESSURE: 69 MMHG | HEART RATE: 49 BPM

## 2022-09-30 DIAGNOSIS — U09.9 POST-COVID SYNDROME: ICD-10-CM

## 2022-09-30 DIAGNOSIS — I25.10 ATHEROSCLEROSIS OF NATIVE CORONARY ARTERY OF NATIVE HEART WITHOUT ANGINA PECTORIS: Primary | ICD-10-CM

## 2022-09-30 DIAGNOSIS — I50.32 CHRONIC DIASTOLIC HEART FAILURE (HCC): ICD-10-CM

## 2022-09-30 DIAGNOSIS — Z98.61 POSTSURGICAL PERCUTANEOUS TRANSLUMINAL CORONARY ANGIOPLASTY STATUS: ICD-10-CM

## 2022-09-30 DIAGNOSIS — E78.00 PURE HYPERCHOLESTEROLEMIA: ICD-10-CM

## 2022-09-30 PROCEDURE — 1123F ACP DISCUSS/DSCN MKR DOCD: CPT | Performed by: INTERNAL MEDICINE

## 2022-09-30 PROCEDURE — G8536 NO DOC ELDER MAL SCRN: HCPCS | Performed by: INTERNAL MEDICINE

## 2022-09-30 PROCEDURE — G8510 SCR DEP NEG, NO PLAN REQD: HCPCS | Performed by: INTERNAL MEDICINE

## 2022-09-30 PROCEDURE — G8427 DOCREV CUR MEDS BY ELIG CLIN: HCPCS | Performed by: INTERNAL MEDICINE

## 2022-09-30 PROCEDURE — 1101F PT FALLS ASSESS-DOCD LE1/YR: CPT | Performed by: INTERNAL MEDICINE

## 2022-09-30 PROCEDURE — 93000 ELECTROCARDIOGRAM COMPLETE: CPT | Performed by: INTERNAL MEDICINE

## 2022-09-30 PROCEDURE — G8420 CALC BMI NORM PARAMETERS: HCPCS | Performed by: INTERNAL MEDICINE

## 2022-09-30 PROCEDURE — 99214 OFFICE O/P EST MOD 30 MIN: CPT | Performed by: INTERNAL MEDICINE

## 2022-09-30 RX ORDER — METOPROLOL TARTRATE 25 MG/1
12.5 TABLET, FILM COATED ORAL DAILY
Qty: 90 TABLET | Refills: 1
Start: 2022-09-30

## 2022-09-30 NOTE — PATIENT INSTRUCTIONS
Medications Discontinued During This Encounter   Medication Reason    metoprolol tartrate (LOPRESSOR) 25 mg tablet DUPLICATE ORDER    tiotropium bromide (Spiriva Respimat) 1.25 mcg/actuation inhaler Therapy Completed    metoprolol tartrate (LOPRESSOR) 25 mg tablet     nitroglycerin (NITROSTAT) 0.4 mg SL tablet Therapy Completed         A Healthy Heart: Care Instructions  Your Care Instructions     Coronary artery disease, also called heart disease, occurs when a substance called plaque builds up in the vessels that supply oxygen-rich blood to your heart muscle. This can narrow the blood vessels and reduce blood flow. A heart attack happens when blood flow is completely blocked. A high-fat diet, smoking, and other factors increase the risk of heart disease. Your doctor has found that you have a chance of having heart disease. You can do lots of things to keep your heart healthy. It may not be easy, but you can change your diet, exercise more, and quit smoking. These steps really work to lower your chance of heart disease. Follow-up care is a key part of your treatment and safety. Be sure to make and go to all appointments, and call your doctor if you are having problems. It's also a good idea to know your test results and keep a list of the medicines you take. How can you care for yourself at home? Diet    Use less salt when you cook and eat. This helps lower your blood pressure. Taste food before salting. Add only a little salt when you think you need it. With time, your taste buds will adjust to less salt. Eat fewer snack items, fast foods, canned soups, and other high-salt, high-fat, processed foods. Read food labels and try to avoid saturated and trans fats. They increase your risk of heart disease by raising cholesterol levels. Limit the amount of solid fat-butter, margarine, and shortening-you eat. Use olive, peanut, or canola oil when you cook.  Bake, broil, and steam foods instead of frying them.     Eat a variety of fruit and vegetables every day. Dark green, deep orange, red, or yellow fruits and vegetables are especially good for you. Examples include spinach, carrots, peaches, and berries. Foods high in fiber can reduce your cholesterol and provide important vitamins and minerals. High-fiber foods include whole-grain cereals and breads, oatmeal, beans, brown rice, citrus fruits, and apples. Eat lean proteins. Heart-healthy proteins include seafood, lean meats and poultry, eggs, beans, peas, nuts, seeds, and soy products. Limit drinks and foods with added sugar. These include candy, desserts, and soda pop. Lifestyle changes    If your doctor recommends it, get more exercise. Walking is a good choice. Bit by bit, increase the amount you walk every day. Try for at least 30 minutes on most days of the week. You also may want to swim, bike, or do other activities. Do not smoke. If you need help quitting, talk to your doctor about stop-smoking programs and medicines. These can increase your chances of quitting for good. Quitting smoking may be the most important step you can take to protect your heart. It is never too late to quit. Limit alcohol to 2 drinks a day for men and 1 drink a day for women. Too much alcohol can cause health problems. Manage other health problems such as diabetes, high blood pressure, and high cholesterol. If you think you may have a problem with alcohol or drug use, talk to your doctor. Medicines    Take your medicines exactly as prescribed. Call your doctor if you think you are having a problem with your medicine. If your doctor recommends aspirin, take the amount directed each day. Make sure you take aspirin and not another kind of pain reliever, such as acetaminophen (Tylenol). When should you call for help? Call 911 if you have symptoms of a heart attack.  These may include:    Chest pain or pressure, or a strange feeling in the chest. Sweating. Shortness of breath. Pain, pressure, or a strange feeling in the back, neck, jaw, or upper belly or in one or both shoulders or arms. Lightheadedness or sudden weakness. A fast or irregular heartbeat. After you call 911, the  may tell you to chew 1 adult-strength or 2 to 4 low-dose aspirin. Wait for an ambulance. Do not try to drive yourself. Watch closely for changes in your health, and be sure to contact your doctor if you have any problems. Where can you learn more? Go to http://www.bull.com/  Enter F075 in the search box to learn more about \"A Healthy Heart: Care Instructions. \"  Current as of: January 10, 2022               Content Version: 13.2  © 2006-2022 [employer]. Care instructions adapted under license by [employer] (which disclaims liability or warranty for this information). If you have questions about a medical condition or this instruction, always ask your healthcare professional. [place] [address] any warranty or liability for your use of this information.

## 2022-09-30 NOTE — PROGRESS NOTES
1. Have you been to the ER, urgent care clinic since your last visit? Hospitalized since your last visit?     no    2. Have you seen or consulted any other health care providers outside of the [address] [place] [address] since your last visit? Include any pap smears or colon screening. Yes pcp         3. Since your last visit, have you had any of the following symptoms? Sob altime but nothing new. 4.  Have you had any blood work, X-rays or cardiac testing?        no           5. Where do you normally have your labs drawn? [facility]    6. Do you need any refills today?    no
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
## 2022-10-05 LAB
A-G RATIO,AGRAT: 1.5 RATIO (ref 1.1–2.6)
ALBUMIN SERPL-MCNC: 4.6 G/DL (ref 3.5–5)
ALP SERPL-CCNC: 114 U/L (ref 40–125)
ALT SERPL-CCNC: 20 U/L (ref 5–40)
AST SERPL W P-5'-P-CCNC: 25 U/L (ref 10–37)
BILIRUB SERPL-MCNC: 0.6 MG/DL (ref 0.2–1.2)
BILIRUBIN, DIRECT,CBIL: <0.2 MG/DL (ref 0–0.3)
CHOLEST SERPL-MCNC: 152 MG/DL (ref 110–200)
GLOBULIN,GLOB: 3.1 G/DL (ref 2–4)
HDLC SERPL-MCNC: 3.8 MG/DL (ref 0–5)
HDLC SERPL-MCNC: 40 MG/DL
LDL/HDL RATIO,LDHD: 2.4
LDLC SERPL CALC-MCNC: 94 MG/DL (ref 50–99)
NON-HDL CHOLESTEROL, 011976: 112 MG/DL
PROT SERPL-MCNC: 7.7 G/DL (ref 6.2–8.1)
TRIGL SERPL-MCNC: 90 MG/DL (ref 40–149)
VLDLC SERPL CALC-MCNC: 18 MG/DL (ref 8–30)

## 2022-10-06 ENCOUNTER — TELEPHONE (OUTPATIENT)
Dept: CARDIOLOGY CLINIC | Age: 77
End: 2022-10-06

## 2022-10-06 DIAGNOSIS — E78.00 PURE HYPERCHOLESTEROLEMIA: Primary | ICD-10-CM

## 2022-10-06 NOTE — TELEPHONE ENCOUNTER
Spoke with pt spouse confirmed pt is taking metoprolol 25 mg 0.5 tablets daily, Zetia 10 mg daily, atorvastatin 80 mg daily, omeprazole 40 mg daily, and aspirin 81 mg daily.

## 2022-10-06 NOTE — PROGRESS NOTES
Please contact patient and do the following asap    Check if compliant with cholesterol meds  Get the names and doses of meds that patient takes and show me asap  Please reinforce diet and exercise.

## 2022-10-06 NOTE — TELEPHONE ENCOUNTER
----- Message from Jaylene Bosworth, MD sent at 10/6/2022  9:49 AM EDT -----  Please contact patient and do the following asap    Check if compliant with cholesterol meds  Get the names and doses of meds that patient takes and show me asap  Please reinforce diet and exercise.

## 2022-10-06 NOTE — TELEPHONE ENCOUNTER
Pt calling to see what vaccine booster she will get.Updated appt. Is for Phizer. She would like it to be the Moderna.      She is going to call to see who offers Moderna and will call back to cancel if she finds this booster somewhere else.      Shefali Tavera RN     Spoke with pt advised cholesterol is higher than last year and likely due to diet since is on same medications. Reinforced diet and repeat labs in 3 months, advised if it remains high he may need more medication. Pt understood and had no questions or concerns at this time.

## 2022-11-10 ENCOUNTER — OFFICE VISIT (OUTPATIENT)
Dept: PULMONOLOGY | Age: 77
End: 2022-11-10
Payer: MEDICARE

## 2022-11-10 VITALS
DIASTOLIC BLOOD PRESSURE: 73 MMHG | OXYGEN SATURATION: 98 % | BODY MASS INDEX: 23.55 KG/M2 | SYSTOLIC BLOOD PRESSURE: 133 MMHG | TEMPERATURE: 97.5 F | WEIGHT: 168.2 LBS | HEIGHT: 71 IN | HEART RATE: 66 BPM | RESPIRATION RATE: 18 BRPM

## 2022-11-10 DIAGNOSIS — I50.32 CHRONIC DIASTOLIC HEART FAILURE (HCC): ICD-10-CM

## 2022-11-10 DIAGNOSIS — J43.1 PANLOBULAR EMPHYSEMA (HCC): ICD-10-CM

## 2022-11-10 DIAGNOSIS — J96.11 CHRONIC RESPIRATORY FAILURE WITH HYPOXIA (HCC): Primary | ICD-10-CM

## 2022-11-10 PROCEDURE — G8420 CALC BMI NORM PARAMETERS: HCPCS | Performed by: INTERNAL MEDICINE

## 2022-11-10 PROCEDURE — G8432 DEP SCR NOT DOC, RNG: HCPCS | Performed by: INTERNAL MEDICINE

## 2022-11-10 PROCEDURE — 1123F ACP DISCUSS/DSCN MKR DOCD: CPT | Performed by: INTERNAL MEDICINE

## 2022-11-10 PROCEDURE — G8427 DOCREV CUR MEDS BY ELIG CLIN: HCPCS | Performed by: INTERNAL MEDICINE

## 2022-11-10 PROCEDURE — G8536 NO DOC ELDER MAL SCRN: HCPCS | Performed by: INTERNAL MEDICINE

## 2022-11-10 PROCEDURE — 1101F PT FALLS ASSESS-DOCD LE1/YR: CPT | Performed by: INTERNAL MEDICINE

## 2022-11-10 PROCEDURE — 99214 OFFICE O/P EST MOD 30 MIN: CPT | Performed by: INTERNAL MEDICINE
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
## 2022-11-10 NOTE — PROGRESS NOTES
Zayra Mojica presents today for   Chief Complaint   Patient presents with    Follow Up Chronic Condition       Is someone accompanying this pt? No    Is the patient using any DME equipment during OV? Oxygen    -[employer]     Depression Screening:  3 most recent PHQ Screens 9/30/2022   Little interest or pleasure in doing things Not at all   Feeling down, depressed, irritable, or hopeless Not at all   Total Score PHQ 2 0       Learning Assessment:  Learning Assessment 3/23/2015   PRIMARY LEARNER Patient   PRIMARY LANGUAGE ENGLISH   LEARNER PREFERENCE PRIMARY LISTENING   ANSWERED BY patient   RELATIONSHIP SELF       Abuse Screening:  No flowsheet data found. Fall Risk  Fall Risk Assessment, last 12 mths 9/30/2022   Able to walk? Yes   Fall in past 12 months? 0   Do you feel unsteady? 0   Are you worried about falling 0         Coordination of Care:  1. Have you been to the ER, urgent care clinic since your last visit? Hospitalized since your last visit? No    2. Have you seen or consulted any other health care providers outside of the [address] [place] [address] since your last visit? Include any pap smears or colon screening.  No

## 2022-12-07 DIAGNOSIS — E78.00 PURE HYPERCHOLESTEROLEMIA: ICD-10-CM

## 2022-12-07 RX ORDER — ATORVASTATIN CALCIUM 80 MG/1
80 TABLET, FILM COATED ORAL DAILY
Qty: 90 TABLET | Refills: 1 | Status: SHIPPED | OUTPATIENT
Start: 2022-12-07

## 2023-03-02 ENCOUNTER — OFFICE VISIT (OUTPATIENT)
Age: 78
End: 2023-03-02
Payer: MEDICARE

## 2023-03-02 VITALS
OXYGEN SATURATION: 93 % | BODY MASS INDEX: 23.52 KG/M2 | SYSTOLIC BLOOD PRESSURE: 98 MMHG | HEART RATE: 52 BPM | WEIGHT: 168 LBS | HEIGHT: 71 IN | DIASTOLIC BLOOD PRESSURE: 57 MMHG

## 2023-03-02 DIAGNOSIS — E78.00 PURE HYPERCHOLESTEROLEMIA, UNSPECIFIED: ICD-10-CM

## 2023-03-02 DIAGNOSIS — Z95.5 HISTORY OF CORONARY ARTERY STENT PLACEMENT: ICD-10-CM

## 2023-03-02 DIAGNOSIS — I50.32 CHRONIC DIASTOLIC (CONGESTIVE) HEART FAILURE (HCC): ICD-10-CM

## 2023-03-02 DIAGNOSIS — U09.9 POST COVID-19 CONDITION, UNSPECIFIED: ICD-10-CM

## 2023-03-02 DIAGNOSIS — I25.118 CORONARY ARTERY DISEASE INVOLVING NATIVE CORONARY ARTERY OF NATIVE HEART WITH OTHER FORM OF ANGINA PECTORIS (HCC): Primary | ICD-10-CM

## 2023-03-02 DIAGNOSIS — B94.8 SEQUELAE OF OTHER SPECIFIED INFECTIOUS AND PARASITIC DISEASES: ICD-10-CM

## 2023-03-02 PROCEDURE — G8420 CALC BMI NORM PARAMETERS: HCPCS | Performed by: INTERNAL MEDICINE

## 2023-03-02 PROCEDURE — 1036F TOBACCO NON-USER: CPT | Performed by: INTERNAL MEDICINE

## 2023-03-02 PROCEDURE — 99214 OFFICE O/P EST MOD 30 MIN: CPT | Performed by: INTERNAL MEDICINE

## 2023-03-02 PROCEDURE — 1123F ACP DISCUSS/DSCN MKR DOCD: CPT | Performed by: INTERNAL MEDICINE

## 2023-03-02 PROCEDURE — G8427 DOCREV CUR MEDS BY ELIG CLIN: HCPCS | Performed by: INTERNAL MEDICINE

## 2023-03-02 PROCEDURE — G8484 FLU IMMUNIZE NO ADMIN: HCPCS | Performed by: INTERNAL MEDICINE

## 2023-03-02 RX ORDER — ROSUVASTATIN CALCIUM 40 MG/1
40 TABLET, COATED ORAL DAILY
Qty: 90 TABLET | Refills: 1 | Status: SHIPPED | OUTPATIENT
Start: 2023-03-02

## 2023-03-02 ASSESSMENT — PATIENT HEALTH QUESTIONNAIRE - PHQ9
2. FEELING DOWN, DEPRESSED OR HOPELESS: 0
1. LITTLE INTEREST OR PLEASURE IN DOING THINGS: 0
SUM OF ALL RESPONSES TO PHQ QUESTIONS 1-9: 0
DEPRESSION UNABLE TO ASSESS: FUNCTIONAL CAPACITY MOTIVATION LIMITS ACCURACY
SUM OF ALL RESPONSES TO PHQ QUESTIONS 1-9: 0
SUM OF ALL RESPONSES TO PHQ9 QUESTIONS 1 & 2: 0

## 2023-03-02 ASSESSMENT — ENCOUNTER SYMPTOMS
SHORTNESS OF BREATH: 1
EYES NEGATIVE: 1
GASTROINTESTINAL NEGATIVE: 1

## 2023-03-02 NOTE — PROGRESS NOTES
1. Have you been to the ER, urgent care clinic since your last visit? Hospitalized since your last visit? Yes, [facility]      2. Where do you normally have your labs drawn? [facility]    3. Do you need any refills today? No    4. Which local pharmacy do you use (enter pharmacy)? [facility]    5. Which mail order pharmacy do you use (enter pharmacy)? No     6. Are you here for surgical clearance and if so who will be doing your     procedure/surgery (care team)?    Yes, [place]

## 2023-03-02 NOTE — PROGRESS NOTES
Shaw Fuentes is a 69y.o. year old male. F/u of CAD, HTN, old PCI, HLD    5/21 history of COVID-19 12/20 and on O2 at 3 L/min since. Followed up by pulmonary due to possible growth in the lung also. 11/19 patient denies significant chest pain, palpitations, edema, dizziness  7/2021  Patient presents to f/u for hospitalization 7/9-7/10 due to chest tightness which occurred after picking up a case of water. Bio markers were negative x 3 , EKG without ischemic changes and echocardiogram with normal LV function and wall motion. He denies recurrence of chest pain. He c/o chronic shortness of breath since COVID 19 infection in Dec 2020.  3/2/2023 continues to have dyspnea on exertion since COVID in 12/20. Still on home O2 at 2 L/min (portable) and 3 L/min with oxygen generator at home. Gets dyspneic walking within the house. Denies chest pain, edema, palpitations, dizziness or loss of consciousness. Review of Systems   Constitutional: Negative. HENT: Negative. Eyes: Negative. Respiratory:  Positive for shortness of breath. Cardiovascular: Negative. Gastrointestinal: Negative. Endocrine: Negative. Genitourinary: Negative. Musculoskeletal: Negative. Neurological: Negative. Psychiatric/Behavioral: Negative. All other systems reviewed and are negative. Physical Exam  Vitals and nursing note reviewed. Constitutional:       Appearance: Normal appearance. HENT:      Head: Normocephalic and atraumatic. Nose: Nose normal.   Eyes:      Conjunctiva/sclera: Conjunctivae normal.   Cardiovascular:      Rate and Rhythm: Normal rate and regular rhythm. Pulses: Normal pulses. Heart sounds: Heart sounds are distant. Pulmonary:      Effort: Pulmonary effort is normal.      Breath sounds: Decreased breath sounds and rales present. Comments: Diffuse bilateral anterior and posterior fine rales in inspiration.   Abdominal:      General: Bowel sounds are normal. Palpations: Abdomen is soft. Musculoskeletal:         General: Normal range of motion. Right lower leg: No edema. Left lower leg: No edema. Skin:     General: Skin is warm and dry. Neurological:      General: No focal deficit present. Mental Status: He is alert and oriented to person, place, and time. Psychiatric:         Mood and Affect: Mood normal.         Behavior: Behavior normal.            Diagnostic Studies:    7/10/2021 Echo  CONCLUSIONS     * Normal left ventricular cavity size and wall thickness. * Normal left ventricular systolic function. * Visually estimated ejection fraction greater than 65%. * Diastolic dysfunction present. * Normal right ventricular size and function. * Increased left atrial chamber size. * Increased right atrial chamber size. * No hemodynamically significant valve pathology. * RVSP 40 to 45 mmHg.    07/22/21    NUCLEAR CARDIAC STRESS TEST 07/22/2021 7/22/2021    Interpretation Summary  · Baseline ECG: Sinus bradycardia, Mild first-degree AV block,  ms. J-point elevation consistent with early repolarization. .  · Stress test: Negative stress test.  · SPECT: Left ventricular function post-stress was normal. Calculated ejection fraction is 81%. There is no evidence of transient ischemic dilation (TID). The TID ratio is 1.04.  · SPECT: Left ventricular perfusion is normal. Myocardial perfusion imaging supports a low risk stress test.    Signed by: Taqueria Linn MD on 7/22/2021  1:15 PM  2/23 nuclear stress test  CONCLUSIONS     * 1. Moderate sized, partially reversible anterior, anteroseptal perfusion   defect. Suspect mixed infarction/ischemia. * 2. No regional wall motion abnormalities. * 3. Normal left ventricular systolic function, ejection fraction 70%.       ASSESSMENT & PLAN    Lab Results   Component Value Date    CHOL 152 10/05/2022    CHOL 115 06/02/2021     Lab Results   Component Value Date TRIG 90 10/05/2022    TRIG 77 06/02/2021     Lab Results   Component Value Date    HDL 40 10/05/2022    HDL 3.8 10/05/2022    HDL 35 (L) 06/02/2021    HDL 3.3 06/02/2021     Lab Results   Component Value Date    LDLCALC 94 10/05/2022    LDLCALC 65 06/02/2021     Lab Results   Component Value Date    LABVLDL 18 10/05/2022    LABVLDL 15 06/02/2021     No results found for: CHOLHDLRATIO    HLD: Results for Teri Rodriguez (MRN [id number]) as of 8/19/2021 09:52   Ref. Range 10/22/2015 07:28 11/14/2017 07:18 11/5/2018 08:07 6/2/2021 08:33   Triglyceride Latest Ref Range: 40 - 149 mg/dL 78 109 67 77   Cholesterol, total Latest Ref Range: 110 - 200 mg/dL 121 134 114 115   HDL Cholesterol Latest Ref Range: >=40 mg/dL 37 (L) 39 (L) 38 (L) 35 (L)   CHOLESTEROL/HDL Latest Ref Range: 0.0 - 5.0    3.0 3.3   Non-HDL Cholesterol Latest Ref Range: <130 mg/dL    80   VLDL, calculated Latest Ref Range: 8 - 30 mg/dL 16 22 13 15   LDL, calculated Latest Ref Range: 50 - 99 mg/dL 68 73 63 65         CAD: LAD CLARIBEL last 3/2011    11/19 CAD stable clinically and CHF is well compensated. Patient is mildly bradycardic but asymptomatic. We will continue present doses of beta-blocker. Routine labs to check lipids electrolytes and liver functions. 5/27/2021 CAD stable clinically. Lipids need to be followed but were well controlled 2 years ago and since then he has lost significant weight and is Covid disease. CHF is new but compensated clinically. NYHA class II. Hopefully it improves and oxygen can be discontinued at home. Being followed by pulmonary. Labs as ordered. Mediterranean diet guidelines printed. 7/2021  Patient with H/O CAD, recently admitted to [facility] due to chest tightness after lifting heavy object. No recurrent chest pain. Troponin, BMP, echo reviewed and discussed with patient. Normal labs, normal LV function, no significant valvular pathology.   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  To f/u with Dr. Jessica Edwards post testing.    8/19/2021 CHF compensated. [id number]  Stress test did not reveal any ischemia. CAD stable. Continue medical treatment for CAD. Patient has not used nitroglycerin for many years and he did not want it. Lipids are well controlled. Continue statins. He is recovering fairly well post Covid but still needing oxygen. 9/30/2022 CAD stable without angina. CHF compensated NYHA2  Lung nodule being followed by pulmonary, Dr. Christa Siemens. Lipids were controlled but need to be repeated and were ordered. Unfortunately seems to have had significant lung damage from COVID and is required a long-term oxygen. Ramona Caicedo was seen today for follow-up. Diagnoses and all orders for this visit:    Coronary artery disease involving native coronary artery of native heart with other form of angina pectoris [facility])  -     Case Request Cardiac Cath Lab  -     CBC; Future  -     Basic Metabolic Panel; Future  -     Protime-INR; Future    Chronic diastolic (congestive) heart failure (HCC)    History of coronary artery stent placement    Post covid-19 condition, unspecified    Sequelae of other specified infectious and parasitic diseases    Pure hypercholesterolemia, unspecified  -     rosuvastatin (CRESTOR) 40 MG tablet; Take 1 tablet by mouth daily  -     Hepatic Function Panel; Future  -     Lipid Panel; Future        Pertinent laboratory and test data reviewed and discussed with patient.   See patient instructions also for other medical advice given    Medications Discontinued During This Encounter   Medication Reason    atorvastatin (LIPITOR) 80 MG tablet Alternate therapy       Follow-up and Dispositions    Return in about 2 months (around 5/2/2023), or if symptoms worsen or fail to improve, for post test.           Return to ER if any significant CP not relieved by s/l NTG, severe SOB, severe palpitations, loss of consciousness    3/2/2023 patient admitted with atypical angina with dyspnea and found to have abnormal stress test showing LAD ischemia and scar combination. He never had any chest pains but he may be having dyspnea as his angina equivalent is in CCS 3. Since it is an LAD territory where he had PCI in the past, would recommend cardiac catheterization and possible PCI again. Procedure discussed with patient and he agrees. We talked about slightly increased risk than usual because of his lung condition post COVID. 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 The benefits and risks of cardiac catheterization have been discussed in detail with the patient present at the time. Patient understands  risk of potential cath complications including but not limited to bleeding, infection, difficulty healing the arteriotomy access site(2 chances in 100) which may require surgical repair, potential thromboembolic complications which could result in stroke, myocardial infarction, vascular injury, loss of limb or organ function and/or death( 1 chance in 1000)and potential allergic reaction to contrast dye or other medication used during the procedure. Patient is also aware of the therapeutic implications for medical management vs coronary artery bypass surgery vs percutaneous coronary intervention in treatment of coronary artery disease. The additional risks for percutaneous coronary artery intervention include the need for emergent bypass surgery at 1 chance in 100 and for restenosis which may range from 35% for plain balloon angioplasty, 15% for bare metal stent, and 5% for drug eluting stent implants. The need for mandatory uninterrupted dual antiplatelet therapy with lifelong Aspirin combined with Plavix for up to 12 months following drug eluting stents, and minimum 1 month following bare metal stents to prevent stent thrombosis which is the equivalent of acute heart attack has been reviewed in detail. No contraindications to use of drug eluting stents has been discovered.

## 2023-03-06 ENCOUNTER — TELEPHONE (OUTPATIENT)
Age: 78
End: 2023-03-06

## 2023-03-06 NOTE — TELEPHONE ENCOUNTER
----- Message from Denny Sprague sent at 3/2/2023 12:17 PM EST -----  Regarding: case cath request  Please schedule this patient for a case cath request possible pci in [place] with Dr Estella Harrington.

## 2023-03-08 NOTE — TELEPHONE ENCOUNTER
Instructions    Patients Name:  Bobo Gatica    You are scheduled to have a Cath/PCI on 3/13/23  at [facility] Please check in at 6:30 a.m. Please go [facility] and park in the outpatient parking lot that is located around to the back of the hospital and enter through the [facility] building. Once you enter through the [facility] check in with the  there. The  will either give you directions or assist you in getting to the cath holding area. You are not to eat anything after midnight before the procedure. Please continue to drink fluids up until 12:00.  (water, juice, black coffee, soft drinks). Do not drink milk or milk products or anything with cream. You may take medications(except for diabetes) with a small sip of water before 6am on the day of the procedure. .    If you are diabetic, do not take your insulin/sugar pill the morning of the procedure. MEDICATION INSTRUCTIONS:   Please take your morning medications with the following special instructions:    [x]          Please make sure to take your Blood pressure medication : With just enough water to swallow. [x]          Take your Aspirin and/or Plavix. With just enough water to swallow. []          Stop your Coumadin on   and do not resume it until after the procedure.     []          Take Prednisone 60 mg and Benadryl 25 mg by mouth at Bedtime on  and again on  at . This is to prevent you from having an allergic reaction to the dye. We encourage families to wait in the waiting room on the first floor while the procedure is being done. The Doctor will come out and talk with you as soon as the procedure is over. There is the possibility that you may spend the night in the hospital, depending on the results of the procedure. This will be determined after the procedure is done. If angioplasty or stent is planned, you will stay at least one day.     If you or your family have any questions, please call our office Monday -Friday, 9:00 a.m.-4:30 p.m.,  At [phone], and ask to speak to one of the nurses. 3/8/23 spoke with patient and he is aware of his Cath, date time, location and instructions. Was advised to call the office if he has any questions or concerns.

## 2023-03-13 ENCOUNTER — HOSPITAL ENCOUNTER (OUTPATIENT)
Facility: HOSPITAL | Age: 78
Setting detail: OUTPATIENT SURGERY
Discharge: HOME | End: 2023-03-13
Attending: INTERNAL MEDICINE | Admitting: INTERNAL MEDICINE
Payer: MEDICARE

## 2023-03-13 VITALS
RESPIRATION RATE: 13 BRPM | HEIGHT: 71 IN | BODY MASS INDEX: 23.52 KG/M2 | TEMPERATURE: 97.8 F | HEART RATE: 56 BPM | OXYGEN SATURATION: 95 % | DIASTOLIC BLOOD PRESSURE: 62 MMHG | SYSTOLIC BLOOD PRESSURE: 114 MMHG | WEIGHT: 168 LBS

## 2023-03-13 DIAGNOSIS — I25.118 ATHSCL HEART DISEASE OF NATIVE COR ART W OTH ANG PCTRS (HCC): ICD-10-CM

## 2023-03-13 LAB
ACT BLD: 239 SECS (ref 79–138)
ECHO BSA: 1.95 M2

## 2023-03-13 PROCEDURE — 93458 L HRT ARTERY/VENTRICLE ANGIO: CPT | Performed by: INTERNAL MEDICINE

## 2023-03-13 PROCEDURE — 99153 MOD SED SAME PHYS/QHP EA: CPT | Performed by: INTERNAL MEDICINE

## 2023-03-13 PROCEDURE — 85347 COAGULATION TIME ACTIVATED: CPT

## 2023-03-13 PROCEDURE — 2709999900 HC NON-CHARGEABLE SUPPLY: Performed by: INTERNAL MEDICINE

## 2023-03-13 PROCEDURE — 92978 ENDOLUMINL IVUS OCT C 1ST: CPT | Performed by: INTERNAL MEDICINE

## 2023-03-13 PROCEDURE — 76000 FLUOROSCOPY <1 HR PHYS/QHP: CPT | Performed by: INTERNAL MEDICINE

## 2023-03-13 PROCEDURE — 92920 PRQ TRLUML C ANGIOP 1ART&/BR: CPT | Performed by: INTERNAL MEDICINE

## 2023-03-13 PROCEDURE — C1887 CATHETER, GUIDING: HCPCS | Performed by: INTERNAL MEDICINE

## 2023-03-13 PROCEDURE — 99152 MOD SED SAME PHYS/QHP 5/>YRS: CPT | Performed by: INTERNAL MEDICINE

## 2023-03-13 PROCEDURE — C1769 GUIDE WIRE: HCPCS | Performed by: INTERNAL MEDICINE

## 2023-03-13 PROCEDURE — 6360000004 HC RX CONTRAST MEDICATION: Performed by: INTERNAL MEDICINE

## 2023-03-13 PROCEDURE — C1725 CATH, TRANSLUMIN NON-LASER: HCPCS | Performed by: INTERNAL MEDICINE

## 2023-03-13 PROCEDURE — C1894 INTRO/SHEATH, NON-LASER: HCPCS | Performed by: INTERNAL MEDICINE

## 2023-03-13 PROCEDURE — 2580000003 HC RX 258: Performed by: INTERNAL MEDICINE

## 2023-03-13 PROCEDURE — 6370000000 HC RX 637 (ALT 250 FOR IP): Performed by: INTERNAL MEDICINE

## 2023-03-13 PROCEDURE — C1753 CATH, INTRAVAS ULTRASOUND: HCPCS | Performed by: INTERNAL MEDICINE

## 2023-03-13 PROCEDURE — C1713 ANCHOR/SCREW BN/BN,TIS/BN: HCPCS | Performed by: INTERNAL MEDICINE

## 2023-03-13 PROCEDURE — 93571 IV DOP VEL&/PRESS C FLO 1ST: CPT | Performed by: INTERNAL MEDICINE

## 2023-03-13 PROCEDURE — 6360000002 HC RX W HCPCS: Performed by: INTERNAL MEDICINE

## 2023-03-13 PROCEDURE — 2500000003 HC RX 250 WO HCPCS: Performed by: INTERNAL MEDICINE

## 2023-03-13 RX ORDER — ACETAMINOPHEN 325 MG/1
650 TABLET ORAL EVERY 4 HOURS PRN
Status: DISCONTINUED | OUTPATIENT
Start: 2023-03-13 | End: 2023-03-13 | Stop reason: HOSPADM

## 2023-03-13 RX ORDER — 0.9 % SODIUM CHLORIDE 0.9 %
INTRAVENOUS SOLUTION INTRAVENOUS CONTINUOUS PRN
Status: COMPLETED | OUTPATIENT
Start: 2023-03-13 | End: 2023-03-13

## 2023-03-13 RX ORDER — SODIUM CHLORIDE 9 MG/ML
INJECTION, SOLUTION INTRAVENOUS PRN
Status: DISCONTINUED | OUTPATIENT
Start: 2023-03-13 | End: 2023-03-13 | Stop reason: HOSPADM

## 2023-03-13 RX ORDER — HEPARIN SODIUM 200 [USP'U]/100ML
INJECTION, SOLUTION INTRAVENOUS CONTINUOUS PRN
Status: COMPLETED | OUTPATIENT
Start: 2023-03-13 | End: 2023-03-13

## 2023-03-13 RX ORDER — IODIXANOL 320 MG/ML
INJECTION, SOLUTION INTRAVASCULAR PRN
Status: DISCONTINUED | OUTPATIENT
Start: 2023-03-13 | End: 2023-03-13 | Stop reason: HOSPADM

## 2023-03-13 RX ORDER — SODIUM CHLORIDE 9 MG/ML
INJECTION, SOLUTION INTRAVENOUS CONTINUOUS
Status: DISCONTINUED | OUTPATIENT
Start: 2023-03-13 | End: 2023-03-13 | Stop reason: HOSPADM

## 2023-03-13 RX ORDER — FENTANYL CITRATE 50 UG/ML
INJECTION, SOLUTION INTRAMUSCULAR; INTRAVENOUS PRN
Status: DISCONTINUED | OUTPATIENT
Start: 2023-03-13 | End: 2023-03-13 | Stop reason: HOSPADM

## 2023-03-13 RX ORDER — NITROGLYCERIN 40 MG/100ML
INJECTION INTRAVENOUS CONTINUOUS PRN
Status: COMPLETED | OUTPATIENT
Start: 2023-03-13 | End: 2023-03-13

## 2023-03-13 RX ORDER — SODIUM CHLORIDE 9 MG/ML
INJECTION, SOLUTION INTRAVENOUS CONTINUOUS
Status: DISCONTINUED | OUTPATIENT
Start: 2023-03-13 | End: 2023-03-13 | Stop reason: SDUPTHER

## 2023-03-13 RX ORDER — MIDAZOLAM HYDROCHLORIDE 1 MG/ML
INJECTION INTRAMUSCULAR; INTRAVENOUS PRN
Status: DISCONTINUED | OUTPATIENT
Start: 2023-03-13 | End: 2023-03-13 | Stop reason: HOSPADM

## 2023-03-13 RX ORDER — SODIUM CHLORIDE 0.9 % (FLUSH) 0.9 %
5-40 SYRINGE (ML) INJECTION EVERY 12 HOURS SCHEDULED
Status: DISCONTINUED | OUTPATIENT
Start: 2023-03-13 | End: 2023-03-13 | Stop reason: HOSPADM

## 2023-03-13 RX ORDER — HEPARIN SODIUM 1000 [USP'U]/ML
INJECTION, SOLUTION INTRAVENOUS; SUBCUTANEOUS PRN
Status: DISCONTINUED | OUTPATIENT
Start: 2023-03-13 | End: 2023-03-13 | Stop reason: HOSPADM

## 2023-03-13 RX ORDER — SODIUM CHLORIDE 0.9 % (FLUSH) 0.9 %
5-40 SYRINGE (ML) INJECTION PRN
Status: DISCONTINUED | OUTPATIENT
Start: 2023-03-13 | End: 2023-03-13 | Stop reason: HOSPADM

## 2023-03-13 RX ADMIN — SODIUM CHLORIDE: 9 INJECTION, SOLUTION INTRAVENOUS at 08:02

## 2023-03-13 ASSESSMENT — PAIN SCALES - GENERAL: PAINLEVEL_OUTOF10: 3

## 2023-03-13 ASSESSMENT — PAIN DESCRIPTION - LOCATION: LOCATION: OTHER (COMMENT)

## 2023-03-13 NOTE — Clinical Note
TRANSFER - OUT REPORT:     Verbal report given to: Mendy Lewis. Report consisted of patient's Situation, Background, Assessment and   Recommendations(SBAR). Opportunity for questions and clarification was provided. Patient transported with a Cardiac Cath Tech / Patient Care Tech and Oxygen. Oxygen used for patient = nasal cannula, @ 2 - 6 Liters. Patient transported to: holding area.

## 2023-03-13 NOTE — Clinical Note
TRANSFER - IN REPORT:     Verbal report received from: Desirae Srivastava. Report consisted of patient's Situation, Background, Assessment and   Recommendations(SBAR). Opportunity for questions and clarification was provided. Assessment completed upon patient's arrival to unit and care assumed. Patient transported with a Cardiac Cath Tech / Patient Care Tech and Oxygen. Oxygen used for patient = nasal cannula, @ 2 - 6 Liters.

## 2023-03-13 NOTE — Clinical Note
Contrast Dose Calculator:   Patient's age: 69.   Patient's sex: Male. Patient weight (kg) = 76.2. Creatinine level (mg/dL) = 0.7. Creatinine clearance (mL/min): 97.   Contrast concentration (mg/mL) = 320. MACD = 300 mL. Max Contrast dose per Creatinine Cl calculator = 218.25 mL.

## 2023-03-13 NOTE — PROGRESS NOTES
Right wrist TR-Band removed, no bleeding or swelling. Sterile hemostatic dressing applied. Normal radial pulse, normal distal circulation and neuro check. Safety splint applied. Safety instructions reviewed with the patient.

## 2023-03-13 NOTE — Clinical Note
Catheter exchanged over exchange length wire with CATHETER ANGIO 5FR L100CM CARIDAD LIU STL NYL JL3.5 3 SEG BRAID L.

## 2023-03-13 NOTE — DISCHARGE INSTRUCTIONS
DISCHARGE SUMMARY from Nurse    PATIENT INSTRUCTIONS:    After general anesthesia or intravenous sedation, for 24 hours or while taking prescription Narcotics:  Limit your activities  Do not drive and operate hazardous machinery  Do not make important personal or business decisions  Do  not drink alcoholic beverages  If you have not urinated within 8 hours after discharge, please contact your surgeon on call. Report the following to your surgeon:  Excessive pain, swelling, redness or odor of or around the surgical area  Temperature over 100.5  Nausea and vomiting lasting longer than 4 hours or if unable to take medications  Any signs of decreased circulation or nerve impairment to extremity: change in color, persistent  numbness, tingling, coldness or increase pain  Any questions    What to do at Home:  Recommended activity: no lifting, Driving, or Strenuous exercise for 24 hours. If you experience any of the following symptoms bleeding,swelling,acute pain or numbness, fever, please follow up with Dr. Mari Rangel MD.    *  Please give a list of your current medications to your Primary Care Provider. *  Please update this list whenever your medications are discontinued, doses are      changed, or new medications (including over-the-counter products) are added. *  Please carry medication information at all times in case of emergency situations. These are general instructions for a healthy lifestyle:    No smoking/ No tobacco products/ Avoid exposure to second hand smoke  Surgeon General's Warning:  Quitting smoking now greatly reduces serious risk to your health.     Obesity, smoking, and sedentary lifestyle greatly increases your risk for illness    A healthy diet, regular physical exercise & weight monitoring are important for maintaining a healthy lifestyle    You may be retaining fluid if you have a history of heart failure or if you experience any of the following symptoms:  Weight gain of 3 pounds or more overnight or 5 pounds in a week, increased swelling in our hands or feet or shortness of breath while lying flat in bed. Please call your doctor as soon as you notice any of these symptoms; do not wait until your next office visit. The discharge information has been reviewed with the patient. The patient verbalized understanding. Discharge medications reviewed with the patient and appropriate educational materials and side effects teaching were provided. ___________________________________________________________________________________________________________________________________     Coronary Angiogram: What to Expect at [address] [place] [address]     A coronary angiogram is a test to examine the large blood vessels of your heart (coronary arteries). The doctor inserted a thin, flexible tube (catheter) into a blood vessel in your groin or wrist.  Your groin or wrist may have a bruise and feel sore for a few days after the procedure. You can do light activities around the house. But do not do anything strenuous until your doctor says it is okay. This may be for several days. This care sheet gives you a general idea about how long it will take for you to recover. But each person recovers at a different pace. Follow the steps below to feel better as quickly as possible. How can you care for yourself at home? Activity    If the doctor gave you a sedative: For 24 hours, don't do anything that requires attention to detail, such as going to work, making important decisions, or signing any legal documents. It takes time for the medicine's effects to completely wear off. For your safety, do not drive or operate any machinery that could be dangerous. Wait until the medicine wears off and you can think clearly and react easily. Do not do strenuous exercise and do not lift, pull, or push anything heavy until your doctor says it is okay. This may be for several days.  You can walk around the house and do light activity, such as cooking. If the catheter was placed in your groin, try not to walk up stairs for the first couple of days. If the catheter was placed in your wrist, do not bend your wrist deeply for the first couple of days. Be careful using your hand to get into and out of a chair or bed. Get regular exercise. Walking may be a good choice. Try for at least 30 minutes on most days of the week. Diet    Drink plenty of fluids to help your body flush out the dye. If you have kidney, heart, or liver disease and have to limit fluids, talk with your doctor before you increase the amount of fluids you drink. Keep eating a heart-healthy diet that has lots of fruits, vegetables, and whole grains. If you have not been eating this way, talk to your doctor. You also may want to talk to a dietitian. This expert can help you to learn about healthy foods and plan meals. Medicines    Your doctor will tell you if and when you can restart your medicines. You will also be given instructions about taking any new medicines. If you stopped taking aspirin or some other blood thinner, your doctor will tell you when to start taking it again. Your doctor may prescribe a blood-thinning medicine like aspirin or clopidogrel (Plavix). It is very important that you take these medicines exactly as directed in order to keep the coronary artery open and reduce your risk of a heart attack. Be safe with medicines. Call your doctor if you think you are having a problem with your medicine. Care of the catheter site    For 1 or 2 days, keep a bandage over the spot where the catheter was inserted. The bandage probably will fall off in this time. Put ice or a cold pack on the area for 10 to 20 minutes at a time to help with soreness or swelling. Put a thin cloth between the ice and your skin. You may shower 24 to 48 hours after the procedure, if your doctor okays it. Pat the incision dry.      Do not soak the catheter site until it is healed. Don't take a bath for 1 week, or until your doctor tells you it is okay. Watch for bleeding from the site. A small amount of blood (up to the size of a quarter) on the bandage can be normal.     If you are bleeding, lie down and press on the area for 15 minutes to try to make it stop. If the bleeding does not stop, call your doctor or seek immediate medical care. Follow-up care is a key part of your treatment and safety. Be sure to make and go to all appointments, and call your doctor if you are having problems. It's also a good idea to know your test results and keep a list of the medicines you take. When should you call for help? Call 911 anytime you think you may need emergency care. For example, call if:    You passed out (lost consciousness). You have severe trouble breathing. You have sudden chest pain and shortness of breath, or you cough up blood. You have symptoms of a heart attack. These may include:  Chest pain or pressure, or a strange feeling in the chest.  Sweating. Shortness of breath. Nausea or vomiting. Pain, pressure, or a strange feeling in the back, neck, jaw, or upper belly, or in one or both shoulders or arms. Lightheadedness or sudden weakness. A fast or irregular heartbeat. After you call 911, the  may tell you to chew 1 adult-strength or 2 to 4 low-dose aspirin. Wait for an ambulance. Do not try to drive yourself. You have been diagnosed with angina, and you have symptoms that do not go away with rest or are not getting better within 5 minutes after you take a dose of nitroglycerin. Call your doctor now or seek immediate medical care if:    You are bleeding from the area where the catheter was put in your artery. You have a fast-growing, painful lump at the catheter site. You have signs of infection, such as: Increased pain, swelling, warmth, or redness. Red streaks leading from the catheter site.   Pus draining from the catheter site. A fever. Your leg or hand is painful, looks blue, or feels cold, numb, or tingly. Watch closely for changes in your health, and be sure to contact your doctor if you have any problems. Where can you learn more? Go to http://www.woods.com/ and enter D192 to learn more about \"Coronary Angiogram: What to Expect at Home. \"  Current as of: September 7, 2022               Content Version: 13.5  © 9991-8119 Healthwise, Incorporated. Care instructions adapted under license by [facility] ([facility]). If you have questions about a medical condition or this instruction, always ask your healthcare professional. [address] any warranty or liability for your use of this information. Thank you for enrolling in [facility] [facility]. Please follow the instructions below to securely access your online medical record. [employer] allows you to send messages to your doctor, view your test results, renew your prescriptions, schedule appointments, and more. How Do I Sign Up? In your Internet browser, go to [email or web address]  Click on the Sign Up Now link in the Sign In box. You will see the New Member Sign Up page. Enter your [employer] Access Code exactly as it appears below. You will not need to use this code after youve completed the sign-up process. If you do not sign up before the expiration date, you must request a new code. [employer] Access Code: Activation code not generated  Current [employer] Status: Active    Enter your Social Security Number ([id number]) and Date of Birth (mm/dd/yyyy) as indicated and click Submit. You will be taken to the next sign-up page. Create a CRE Securet ID. This will be your [employer] login ID and cannot be changed, so think of one that is secure and easy to remember. Create a [employer] password. You can change your password at any time. Enter your Password Reset Question and Answer.  This can be used at a later time if you forget your password. Enter your e-mail address. You will receive e-mail notification when new information is available in [address] Click Sign Up. You can now view your medical record. Additional Information  If you have questions, please contact your physician practice where you receive care. Remember, MyChart is NOT to be used for urgent needs. For medical emergencies, dial 911.    Patient armband removed and shredded

## 2023-03-13 NOTE — PROGRESS NOTES
Pre-Discharge Checklist for Same Day Discharge Post PCI      Confirm that loading dose of P2Y12i has been administered. YES    Confirm the patient has received prescriptions for at least 30 days of P2Y12i. YES    Confirm prescription for aspirin and statin. YES    Confirm referral to cardiac rehab. YES    Charge nurse plans on calling patient the day after discharge. YES    The cath holding nurse has provided education to patient on how to monitor access site, and the importance of taking DAPT as prescribed and the specific risks of premature discontinuation. YES    The cath holding nurse has provided the patient with an emergency number to call. YES    The cath holding nurse has scheduled a follow up appointment.   YES

## 2023-03-15 ENCOUNTER — TELEPHONE (OUTPATIENT)
Age: 78
End: 2023-03-15

## 2023-03-15 NOTE — TELEPHONE ENCOUNTER
Patient had cath with Dr Harley Armas on 3/13/23. Patient called today stating that he has been having SOB and can not walk from his [facility] room to his bathroom and he is having Left arm pain and these symptoms are active. Patient Advised to go to ER to be evaluated and told patient we would inform you. Please Advise.

## 2023-03-20 ENCOUNTER — TELEPHONE (OUTPATIENT)
Age: 78
End: 2023-03-20

## 2023-03-20 NOTE — TELEPHONE ENCOUNTER
Patient is scheduled for Thursday to have cataract surgery with [facility] and patient would like to know if he is cleared to have this done on Thursday. Fax number [phone]. Please Advise.

## 2023-03-20 NOTE — TELEPHONE ENCOUNTER
Patient just had a stent a week ago in his coronary artery. We would not want to stop his medications.   It will be better if his cataract surgery is postponed for 3 months unless it is urgent

## 2023-03-21 ENCOUNTER — TELEPHONE (OUTPATIENT)
Age: 78
End: 2023-03-21

## 2023-03-21 NOTE — TELEPHONE ENCOUNTER
Spoke to patient per Dr. Jessica Edwards regarding clearance for eye surgery. He should tell his doctors that he had a stent in his heart recently. See what they say. If the doctors can do the surgery without stopping any heart medications including aspirin and Brilinta, he can have the surgery. Please send this note to his eye surgeon also. He voices understanding and acceptance of this advice and will call back if any further questions or concerns.

## 2023-03-21 NOTE — TELEPHONE ENCOUNTER
Patient called and stated if he billie this surgery he will have to pay them 200 to cancel this appointment.  Please advise

## 2023-03-21 NOTE — TELEPHONE ENCOUNTER
He should tell his doctors that he had a stent in his heart recently. See what they say. If the doctors can do the surgery without stopping any heart medications including aspirin and Brilinta, he can have the surgery. Please send this note to his eye surgeon also.

## 2023-05-01 LAB
A/G RATIO: 1.7 RATIO (ref 1.1–2.6)
ALBUMIN SERPL-MCNC: 4.7 G/DL (ref 3.5–5)
ALP BLD-CCNC: 92 U/L (ref 40–125)
ALT SERPL-CCNC: 30 U/L (ref 5–40)
AST SERPL-CCNC: 31 U/L (ref 10–37)
BILIRUB SERPL-MCNC: 0.7 MG/DL (ref 0.2–1.2)
BILIRUBIN DIRECT: <0.2 MG/DL (ref 0–0.3)
CHOLESTEROL/HDL RATIO: 2.5 (ref 0–5)
CHOLESTEROL: 111 MG/DL (ref 110–200)
GLOBULIN: 2.8 G/DL (ref 2–4)
HDLC SERPL-MCNC: 44 MG/DL
LDL CHOLESTEROL CALCULATED: 51 MG/DL (ref 50–99)
LDL/HDL RATIO: 1.2
NON-HDL CHOLESTEROL: 67 MG/DL
TOTAL PROTEIN: 7.5 G/DL (ref 6.2–8.1)
TRIGL SERPL-MCNC: 82 MG/DL (ref 40–149)
VLDLC SERPL CALC-MCNC: 16 MG/DL (ref 8–30)

## 2023-05-11 ENCOUNTER — OFFICE VISIT (OUTPATIENT)
Age: 78
End: 2023-05-11

## 2023-05-11 VITALS
SYSTOLIC BLOOD PRESSURE: 127 MMHG | WEIGHT: 162 LBS | DIASTOLIC BLOOD PRESSURE: 76 MMHG | HEIGHT: 73 IN | OXYGEN SATURATION: 95 % | BODY MASS INDEX: 21.47 KG/M2 | HEART RATE: 30 BPM

## 2023-05-11 DIAGNOSIS — I25.118 CORONARY ARTERY DISEASE OF NATIVE ARTERY OF NATIVE HEART WITH STABLE ANGINA PECTORIS (HCC): Primary | ICD-10-CM

## 2023-05-11 DIAGNOSIS — Z95.5 HISTORY OF CORONARY ARTERY STENT PLACEMENT: ICD-10-CM

## 2023-05-11 DIAGNOSIS — U09.9 POST COVID-19 CONDITION, UNSPECIFIED: ICD-10-CM

## 2023-05-11 DIAGNOSIS — Z98.61 CORONARY ANGIOPLASTY STATUS: ICD-10-CM

## 2023-05-11 DIAGNOSIS — E78.00 PURE HYPERCHOLESTEROLEMIA, UNSPECIFIED: ICD-10-CM

## 2023-05-11 DIAGNOSIS — I50.32 CHRONIC DIASTOLIC (CONGESTIVE) HEART FAILURE (HCC): ICD-10-CM

## 2023-05-11 DIAGNOSIS — B94.8 SEQUELAE OF OTHER SPECIFIED INFECTIOUS AND PARASITIC DISEASES: ICD-10-CM

## 2023-05-11 RX ORDER — CLOPIDOGREL BISULFATE 75 MG/1
75 TABLET ORAL DAILY
Qty: 90 TABLET | Refills: 1 | Status: SHIPPED | OUTPATIENT
Start: 2023-05-11

## 2023-05-11 ASSESSMENT — PATIENT HEALTH QUESTIONNAIRE - PHQ9
1. LITTLE INTEREST OR PLEASURE IN DOING THINGS: 0
SUM OF ALL RESPONSES TO PHQ QUESTIONS 1-9: 0
2. FEELING DOWN, DEPRESSED OR HOPELESS: 0
SUM OF ALL RESPONSES TO PHQ9 QUESTIONS 1 & 2: 0

## 2023-05-11 ASSESSMENT — ENCOUNTER SYMPTOMS
GASTROINTESTINAL NEGATIVE: 1
SHORTNESS OF BREATH: 1
EYES NEGATIVE: 1

## 2023-05-11 NOTE — PROGRESS NOTES
1. Have you been to the ER, urgent care clinic since your last visit? Hospitalized since your last visit?     no    2. Where do you normally have your labs drawn?   [facility]    3. Do you need any refills today?   no    4. Which local pharmacy do you use (enter pharmacy)? [facility] [place]    5. Which mail order pharmacy do you use (enter pharmacy)?   no     6. Are you here for surgical clearance and if so who will be doing your     procedure/surgery (care team)?    no
(PLAVIX) 75 MG tablet; Take 1 tablet by mouth daily        Pertinent laboratory and test data reviewed and discussed with patient. See patient instructions also for other medical advice given    Medications Discontinued During This Encounter   Medication Reason    ticagrelor (BRILINTA) 90 MG TABS tablet Alternate therapy       Follow-up and Dispositions    Return in about 6 months (around 11/11/2023), or if symptoms worsen or fail to improve, for with EKG. Return to ER if any significant CP not relieved by s/l NTG, severe SOB, severe palpitations, loss of consciousness    5/11/2023 CAD is stable and dyspnea is somewhat improved post PCI. Lipids have responded well to Crestor. He is changing his dietary habits and is trying to walk regularly. Due to cost, change Brilinta to Plavix. Encouraged to stay on the healthy lifestyle course.

## 2023-07-25 DIAGNOSIS — Z98.61 CORONARY ANGIOPLASTY STATUS: ICD-10-CM

## 2023-07-25 DIAGNOSIS — E78.00 PURE HYPERCHOLESTEROLEMIA, UNSPECIFIED: Primary | ICD-10-CM

## 2023-07-25 RX ORDER — ROSUVASTATIN CALCIUM 40 MG/1
40 TABLET, COATED ORAL DAILY
Qty: 90 TABLET | Refills: 3 | Status: SHIPPED | OUTPATIENT
Start: 2023-07-25

## 2023-07-25 RX ORDER — CLOPIDOGREL BISULFATE 75 MG/1
75 TABLET ORAL DAILY
Qty: 90 TABLET | Refills: 3 | Status: SHIPPED | OUTPATIENT
Start: 2023-07-25

## 2023-07-25 RX ORDER — EZETIMIBE 10 MG/1
10 TABLET ORAL DAILY
Qty: 90 TABLET | Refills: 3 | Status: SHIPPED | OUTPATIENT
Start: 2023-07-25

## 2023-07-25 NOTE — TELEPHONE ENCOUNTER
Requested Prescriptions     Pending Prescriptions Disp Refills    metoprolol tartrate (LOPRESSOR) 25 MG tablet 90 tablet 3     Sig: Take 1 tablet by mouth daily    rosuvastatin (CRESTOR) 40 MG tablet 90 tablet 3     Sig: Take 1 tablet by mouth daily    ezetimibe (ZETIA) 10 MG tablet 90 tablet 3     Sig: Take 1 tablet by mouth daily    clopidogrel (PLAVIX) 75 MG tablet 90 tablet 3     Sig: Take 1 tablet by mouth daily

## 2023-09-19 DIAGNOSIS — Z98.61 CORONARY ANGIOPLASTY STATUS: ICD-10-CM

## 2023-09-19 NOTE — TELEPHONE ENCOUNTER
Requested Prescriptions     Pending Prescriptions Disp Refills    metoprolol tartrate (LOPRESSOR) 25 MG tablet 90 tablet 3     Sig: Take 1 tablet by mouth daily

## 2023-10-26 ENCOUNTER — ANTI-COAG VISIT (OUTPATIENT)
Age: 78
End: 2023-10-26

## 2023-11-06 ENCOUNTER — OFFICE VISIT (OUTPATIENT)
Age: 78
End: 2023-11-06
Payer: MEDICARE

## 2023-11-06 VITALS
BODY MASS INDEX: 20.41 KG/M2 | HEIGHT: 73 IN | WEIGHT: 154 LBS | SYSTOLIC BLOOD PRESSURE: 117 MMHG | DIASTOLIC BLOOD PRESSURE: 60 MMHG | HEART RATE: 51 BPM

## 2023-11-06 DIAGNOSIS — Z95.5 HISTORY OF CORONARY ARTERY STENT PLACEMENT: ICD-10-CM

## 2023-11-06 DIAGNOSIS — I50.32 CHRONIC DIASTOLIC HEART FAILURE (HCC): Primary | ICD-10-CM

## 2023-11-06 DIAGNOSIS — E78.00 PURE HYPERCHOLESTEROLEMIA: ICD-10-CM

## 2023-11-06 DIAGNOSIS — I25.118 CORONARY ARTERY DISEASE OF NATIVE ARTERY OF NATIVE HEART WITH STABLE ANGINA PECTORIS (HCC): ICD-10-CM

## 2023-11-06 PROCEDURE — 93000 ELECTROCARDIOGRAM COMPLETE: CPT | Performed by: INTERNAL MEDICINE

## 2023-11-06 PROCEDURE — 1036F TOBACCO NON-USER: CPT | Performed by: INTERNAL MEDICINE

## 2023-11-06 PROCEDURE — 1123F ACP DISCUSS/DSCN MKR DOCD: CPT | Performed by: INTERNAL MEDICINE

## 2023-11-06 PROCEDURE — G8420 CALC BMI NORM PARAMETERS: HCPCS | Performed by: INTERNAL MEDICINE

## 2023-11-06 PROCEDURE — 99214 OFFICE O/P EST MOD 30 MIN: CPT | Performed by: INTERNAL MEDICINE

## 2023-11-06 PROCEDURE — G8427 DOCREV CUR MEDS BY ELIG CLIN: HCPCS | Performed by: INTERNAL MEDICINE

## 2023-11-06 PROCEDURE — G8484 FLU IMMUNIZE NO ADMIN: HCPCS | Performed by: INTERNAL MEDICINE

## 2023-11-06 ASSESSMENT — PATIENT HEALTH QUESTIONNAIRE - PHQ9
2. FEELING DOWN, DEPRESSED OR HOPELESS: 0
SUM OF ALL RESPONSES TO PHQ QUESTIONS 1-9: 0
SUM OF ALL RESPONSES TO PHQ9 QUESTIONS 1 & 2: 0
SUM OF ALL RESPONSES TO PHQ QUESTIONS 1-9: 0
1. LITTLE INTEREST OR PLEASURE IN DOING THINGS: 0
DEPRESSION UNABLE TO ASSESS: FUNCTIONAL CAPACITY MOTIVATION LIMITS ACCURACY

## 2023-11-06 ASSESSMENT — ENCOUNTER SYMPTOMS
GASTROINTESTINAL NEGATIVE: 1
EYES NEGATIVE: 1
SHORTNESS OF BREATH: 1

## 2023-11-06 NOTE — PROGRESS NOTES
1. Have you been to the ER, urgent care clinic since your last visit? Hospitalized since your last visit? No      2. Where do you normally have your labs drawn? [facility]    3. Do you need any refills today? No    4. Which local pharmacy do you use (enter pharmacy)? [facility]    5. Which mail order pharmacy do you use (enter pharmacy)? No     6. Are you here for surgical clearance and if so who will be doing your     procedure/surgery (care team)?    No
follow-up. Diagnoses and all orders for this visit:    Chronic diastolic heart failure ([address])  -     EKG 12 Lead    Coronary artery disease of native artery of native heart with stable angina pectoris ([address])    History of coronary artery stent placement    Pure hypercholesterolemia          See patient instructions also for other medical advice given    There are no discontinued medications. Follow-up and Dispositions    Return in about 6 months (around 5/6/2024), or if symptoms worsen or fail to improve. Return to ER if any significant CP not relieved by s/l NTG, severe SOB, severe palpitations, loss of consciousness    11/6/2023  Plan for medicines : CAD stable. Continue aspirin, Plavix, metoprolol. Lipids are excellent. Continue Crestor. CHF is compensated NYHA class II due to underlying COPD and home oxygen. Exercise Plan: He is trying to be active and I encouraged him to walk regularly. Diet plan: Mediterranean diet guidelines reinforced.

## 2024-03-21 NOTE — TELEPHONE ENCOUNTER
Last Nov 2023 TSH within ref.range, continue Levothyroxine 75 mcg daily.    Needs lipid panel - ordered.

## 2024-05-06 ENCOUNTER — OFFICE VISIT (OUTPATIENT)
Age: 79
End: 2024-05-06
Payer: MEDICARE

## 2024-05-06 VITALS
HEART RATE: 50 BPM | HEIGHT: 73 IN | SYSTOLIC BLOOD PRESSURE: 102 MMHG | OXYGEN SATURATION: 95 % | DIASTOLIC BLOOD PRESSURE: 57 MMHG | WEIGHT: 158 LBS | BODY MASS INDEX: 20.94 KG/M2

## 2024-05-06 DIAGNOSIS — E78.00 PURE HYPERCHOLESTEROLEMIA: ICD-10-CM

## 2024-05-06 DIAGNOSIS — I50.32 CHRONIC DIASTOLIC HEART FAILURE (HCC): ICD-10-CM

## 2024-05-06 DIAGNOSIS — Z95.5 HISTORY OF CORONARY ARTERY STENT PLACEMENT: ICD-10-CM

## 2024-05-06 DIAGNOSIS — I25.118 CORONARY ARTERY DISEASE OF NATIVE ARTERY OF NATIVE HEART WITH STABLE ANGINA PECTORIS (HCC): Primary | ICD-10-CM

## 2024-05-06 PROCEDURE — G8420 CALC BMI NORM PARAMETERS: HCPCS | Performed by: INTERNAL MEDICINE

## 2024-05-06 PROCEDURE — 1036F TOBACCO NON-USER: CPT | Performed by: INTERNAL MEDICINE

## 2024-05-06 PROCEDURE — 99214 OFFICE O/P EST MOD 30 MIN: CPT | Performed by: INTERNAL MEDICINE

## 2024-05-06 PROCEDURE — G8427 DOCREV CUR MEDS BY ELIG CLIN: HCPCS | Performed by: INTERNAL MEDICINE

## 2024-05-06 PROCEDURE — 1123F ACP DISCUSS/DSCN MKR DOCD: CPT | Performed by: INTERNAL MEDICINE

## 2024-05-06 ASSESSMENT — ENCOUNTER SYMPTOMS
GASTROINTESTINAL NEGATIVE: 1
SHORTNESS OF BREATH: 1
EYES NEGATIVE: 1

## 2024-05-06 NOTE — PROGRESS NOTES
1. Have you been to the ER, urgent care clinic since your last visit?  Hospitalized since your last visit?     Yes When: 1/2024 Where: [facility] Reason for visit: pain pain      2.  Where do you normally have your labs drawn?   [facility]    3. Do you need any refills today?   no    4. Which local pharmacy do you use (enter pharmacy)?   [facility]    5. Which mail order pharmacy do you use (enter pharmacy)?   no     6. Are you here for surgical clearance and if so who will be doing your     procedure/surgery (care team)?   no      
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    5/11/2023 CAD is stable and dyspnea is somewhat improved post PCI.  Lipids have responded well to Crestor.  He is changing his dietary habits and is trying to walk regularly.  Due to cost, change Brilinta to Plavix.  Encouraged to stay on the healthy lifestyle course.    11/6/2023  Plan for medicines : CAD stable.  Continue aspirin, Plavix, metoprolol.  Lipids are excellent.  Continue Crestor.  CHF is compensated NYHA class II due to underlying COPD and home oxygen.  Exercise Plan: He is trying to be active and I encouraged him to walk regularly.  Diet plan: Mediterranean diet guidelines reinforced.    Raj was seen today for follow-up.    Diagnoses and all orders for this visit:    Coronary artery disease of native artery of native heart with stable angina pectoris (HCC)    Chronic diastolic heart failure (HCC)    History of coronary artery stent placement    Pure hypercholesterolemia  -     Hepatic Function Panel; Future  -     Lipid Panel; Future          See patient instructions also for other medical advice given    Medications Discontinued During This Encounter   Medication Reason    clopidogrel (PLAVIX) 75 MG tablet Therapy completed       Follow-up and Dispositions    Return in about 6 months (around 11/6/2024), or if symptoms worsen or fail to improve, for post test/procedure.           Return to ER if any significant CP not relieved by s/l NTG, severe SOB, severe palpitations, loss of consciousness    5/6/2024  Plan for medicines : CAD is clinically stable without any significant angina.  Lipids excellent last year and were reordered for this year.  CHF is compensated NYHA class II.  Will discontinue Plavix as it has been little over a year since PCI.  Exercise Plan: Continue to try to walk as possible with oxygen.  Diet plan: Mediterranean diet guidelines reinforced.

## 2024-05-29 DIAGNOSIS — E78.00 PURE HYPERCHOLESTEROLEMIA, UNSPECIFIED: ICD-10-CM

## 2024-05-29 RX ORDER — ROSUVASTATIN CALCIUM 40 MG/1
40 TABLET, COATED ORAL DAILY
Qty: 90 TABLET | Refills: 1 | Status: SHIPPED | OUTPATIENT
Start: 2024-05-29

## 2024-07-01 LAB
A/G RATIO: 1.6 RATIO (ref 1.1–2.6)
ALBUMIN: 4.7 G/DL (ref 3.5–5)
ALP BLD-CCNC: 83 U/L (ref 40–125)
ALT SERPL-CCNC: 20 U/L (ref 5–40)
AST SERPL-CCNC: 25 U/L (ref 10–37)
BILIRUB SERPL-MCNC: 0.5 MG/DL (ref 0.2–1.2)
BILIRUBIN DIRECT: <0.2 MG/DL (ref 0–0.3)
CHOLESTEROL, TOTAL: 116 MG/DL (ref 110–200)
CHOLESTEROL/HDL RATIO: 2.9 (ref 0–5)
GLOBULIN: 2.9 G/DL (ref 2–4)
HDLC SERPL-MCNC: 40 MG/DL
LDL CHOLESTEROL: 58 MG/DL (ref 50–99)
LDL/HDL RATIO: 1.5
NON-HDL CHOLESTEROL: 76 MG/DL
TOTAL PROTEIN: 7.6 G/DL (ref 6.2–8.1)
TRIGL SERPL-MCNC: 88 MG/DL (ref 40–149)
VLDLC SERPL CALC-MCNC: 18 MG/DL (ref 8–30)

## 2024-09-09 DIAGNOSIS — E78.00 PURE HYPERCHOLESTEROLEMIA, UNSPECIFIED: ICD-10-CM

## 2024-09-09 DIAGNOSIS — Z98.61 CORONARY ANGIOPLASTY STATUS: ICD-10-CM

## 2024-09-09 RX ORDER — EZETIMIBE 10 MG/1
10 TABLET ORAL DAILY
Qty: 90 TABLET | Refills: 3 | Status: SHIPPED | OUTPATIENT
Start: 2024-09-09

## 2024-09-09 RX ORDER — METOPROLOL TARTRATE 25 MG/1
25 TABLET, FILM COATED ORAL DAILY
Qty: 90 TABLET | Refills: 3 | Status: SHIPPED | OUTPATIENT
Start: 2024-09-09

## 2024-11-23 DIAGNOSIS — E78.00 PURE HYPERCHOLESTEROLEMIA, UNSPECIFIED: ICD-10-CM

## 2024-11-25 RX ORDER — ROSUVASTATIN CALCIUM 40 MG/1
40 TABLET, COATED ORAL DAILY
Qty: 90 TABLET | Refills: 3 | Status: SHIPPED | OUTPATIENT
Start: 2024-11-25

## 2024-12-18 ENCOUNTER — OFFICE VISIT (OUTPATIENT)
Age: 79
End: 2024-12-18
Payer: MEDICARE

## 2024-12-18 VITALS
HEART RATE: 54 BPM | WEIGHT: 161 LBS | HEIGHT: 73 IN | DIASTOLIC BLOOD PRESSURE: 59 MMHG | SYSTOLIC BLOOD PRESSURE: 110 MMHG | BODY MASS INDEX: 21.34 KG/M2

## 2024-12-18 DIAGNOSIS — R00.1 SINUS BRADYCARDIA: ICD-10-CM

## 2024-12-18 DIAGNOSIS — Z95.5 HISTORY OF CORONARY ARTERY STENT PLACEMENT: ICD-10-CM

## 2024-12-18 DIAGNOSIS — I25.10 ATHEROSCLEROSIS OF NATIVE CORONARY ARTERY OF NATIVE HEART WITHOUT ANGINA PECTORIS: ICD-10-CM

## 2024-12-18 DIAGNOSIS — J43.1 PANLOBULAR EMPHYSEMA (HCC): ICD-10-CM

## 2024-12-18 DIAGNOSIS — E78.00 PURE HYPERCHOLESTEROLEMIA: ICD-10-CM

## 2024-12-18 DIAGNOSIS — I50.32 CHRONIC DIASTOLIC HEART FAILURE (HCC): Primary | ICD-10-CM

## 2024-12-18 PROCEDURE — G8427 DOCREV CUR MEDS BY ELIG CLIN: HCPCS | Performed by: INTERNAL MEDICINE

## 2024-12-18 PROCEDURE — 1123F ACP DISCUSS/DSCN MKR DOCD: CPT | Performed by: INTERNAL MEDICINE

## 2024-12-18 PROCEDURE — 1160F RVW MEDS BY RX/DR IN RCRD: CPT | Performed by: INTERNAL MEDICINE

## 2024-12-18 PROCEDURE — G8484 FLU IMMUNIZE NO ADMIN: HCPCS | Performed by: INTERNAL MEDICINE

## 2024-12-18 PROCEDURE — 1159F MED LIST DOCD IN RCRD: CPT | Performed by: INTERNAL MEDICINE

## 2024-12-18 PROCEDURE — 99214 OFFICE O/P EST MOD 30 MIN: CPT | Performed by: INTERNAL MEDICINE

## 2024-12-18 PROCEDURE — G8420 CALC BMI NORM PARAMETERS: HCPCS | Performed by: INTERNAL MEDICINE

## 2024-12-18 PROCEDURE — 1036F TOBACCO NON-USER: CPT | Performed by: INTERNAL MEDICINE

## 2024-12-18 PROCEDURE — 93000 ELECTROCARDIOGRAM COMPLETE: CPT | Performed by: INTERNAL MEDICINE

## 2024-12-18 PROCEDURE — 3023F SPIROM DOC REV: CPT | Performed by: INTERNAL MEDICINE

## 2024-12-18 PROCEDURE — 1126F AMNT PAIN NOTED NONE PRSNT: CPT | Performed by: INTERNAL MEDICINE

## 2024-12-18 RX ORDER — METOPROLOL TARTRATE 25 MG/1
12.5 TABLET, FILM COATED ORAL DAILY
Qty: 90 TABLET | Refills: 3 | Status: SHIPPED | OUTPATIENT
Start: 2024-12-18

## 2024-12-18 ASSESSMENT — PATIENT HEALTH QUESTIONNAIRE - PHQ9
SUM OF ALL RESPONSES TO PHQ QUESTIONS 1-9: 0
DEPRESSION UNABLE TO ASSESS: FUNCTIONAL CAPACITY MOTIVATION LIMITS ACCURACY
2. FEELING DOWN, DEPRESSED OR HOPELESS: NOT AT ALL
SUM OF ALL RESPONSES TO PHQ QUESTIONS 1-9: 0
SUM OF ALL RESPONSES TO PHQ9 QUESTIONS 1 & 2: 0
SUM OF ALL RESPONSES TO PHQ QUESTIONS 1-9: 0
SUM OF ALL RESPONSES TO PHQ QUESTIONS 1-9: 0
1. LITTLE INTEREST OR PLEASURE IN DOING THINGS: NOT AT ALL

## 2024-12-18 ASSESSMENT — ENCOUNTER SYMPTOMS
SHORTNESS OF BREATH: 1
GASTROINTESTINAL NEGATIVE: 1
EYES NEGATIVE: 1

## 2024-12-18 NOTE — PROGRESS NOTES
1. Have you been to the ER, urgent care clinic since your last visit?  Hospitalized since your last visit?     No      2.  Where do you normally have your labs drawn?   [facility]    3. Do you need any refills today?   No    4. Which local pharmacy do you use (enter pharmacy)?   [facility]    5. Which mail order pharmacy do you use (enter pharmacy)?   No     6. Are you here for surgical clearance and if so who will be doing your     procedure/surgery (care team)?   No      
[place] where he had PCI in the past, would recommend cardiac catheterization and possible PCI again.  Procedure discussed with patient and he agrees.  We talked about slightly increased risk than usual because of his lung condition post COVID.  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    5/11/2023 CAD is stable and dyspnea is somewhat improved post PCI.  Lipids have responded well to Crestor.  He is changing his dietary habits and is trying to walk regularly.  Due to cost, change Brilinta to Plavix.  Encouraged to stay on the healthy lifestyle course.    11/6/2023  Plan for medicines : CAD stable.  Continue aspirin, Plavix, metoprolol.  Lipids are excellent.  Continue Crestor.  CHF is compensated NYHA class II due to underlying COPD and home oxygen.  Exercise Plan: He is trying to be active and I encouraged him to walk regularly.  Diet plan: Mediterranean diet guidelines reinforced.    5/6/2024  Plan for medicines : CAD is clinically stable without any significant angina.  Lipids excellent last year and were reordered for this year.  CHF is compensated NYHA class II.  Will discontinue Plavix as it has been little over a year since PCI.  Exercise Plan: Continue to try to walk as possible with oxygen.  Diet plan: Mediterranean diet guidelines reinforced.    Raj was seen today for follow-up.    Diagnoses and all orders for this visit:    Chronic diastolic heart failure (HCC)  -     EKG 12 Lead    Atherosclerosis of native coronary artery of native heart without angina pectoris    History of coronary artery stent placement    Pure hypercholesterolemia    Panlobular emphysema (HCC)    Sinus bradycardia    Other orders  -     metoprolol tartrate (LOPRESSOR) 25 MG tablet; Take 0.5 tablets by mouth daily          See patient instructions also for other medical advice given    Medications Discontinued During This Encounter   Medication Reason

## 2025-08-28 DIAGNOSIS — E78.00 PURE HYPERCHOLESTEROLEMIA, UNSPECIFIED: ICD-10-CM

## 2025-08-28 RX ORDER — EZETIMIBE 10 MG/1
10 TABLET ORAL DAILY
Qty: 90 TABLET | Refills: 3 | OUTPATIENT
Start: 2025-08-28

## (undated) DEVICE — GUIDEWIRE VASC L260CM DIA0035IN TIP L3MM PTFE J STD TAPR FIX

## (undated) DEVICE — COVER US PRB W15XL120CM W/ GEL RUBBERBAND TAPE STRP FLD GEN

## (undated) DEVICE — CATHETER ANGIO 5FR L100CM GRY S STL NYL JL3.5 3 SEG BRAID L

## (undated) DEVICE — PROCEDURE KIT FLUID MGMT 10 FR CUST MAINFOLD

## (undated) DEVICE — Device: Brand: EAGLE EYE PLATINUM RX DIGITAL IVUS CATHETER

## (undated) DEVICE — CATHETER ANGIO 5FR L100CM GRY S STL NYL JR4 3 SEG BRAID L

## (undated) DEVICE — CATH BLLN ANGIO 3X15MM NC EUPHORIA RX

## (undated) DEVICE — GLIDESHEATH SLENDER STAINLESS STEEL KIT: Brand: GLIDESHEATH SLENDER

## (undated) DEVICE — SET FLD ADMIN 3 W STPCOCK FIX FEM L BOR 1IN

## (undated) DEVICE — CATHETER ANGIOPLSTY RX 2.7 FRX139 CM 3X15 MM SCOREFLEX NC

## (undated) DEVICE — DEVICE INFL W ACCS + HEMSTAS VLV INSRT TOOL AND TORQ BASIX

## (undated) DEVICE — PACK PROCEDURE SURG VASC CATH 161 MMC LF

## (undated) DEVICE — PRESSURE MONITORING SET: Brand: TRUWAVE

## (undated) DEVICE — Device: Brand: OMNIWIRE PRESSURE GUIDE WIRE

## (undated) DEVICE — TR BAND RADIAL ARTERY COMPRESSION DEVICE: Brand: TR BAND

## (undated) DEVICE — CATH BLLN ANGIO 3.25X15MM NC EUPHORIA RX

## (undated) DEVICE — DRAPE,ANGIO,BRACH,STERILE,38X44: Brand: MEDLINE

## (undated) DEVICE — BAG WST COLL CLR DISP PVC W/ ROTICULATING LUER L BOR TBNG

## (undated) DEVICE — CATHETER GUID 5FR GWIRE 0.058IN COR EXTRA BKUP SUPP 3.5 ACT